# Patient Record
Sex: FEMALE | Race: WHITE
[De-identification: names, ages, dates, MRNs, and addresses within clinical notes are randomized per-mention and may not be internally consistent; named-entity substitution may affect disease eponyms.]

---

## 2017-01-17 ENCOUNTER — HOSPITAL ENCOUNTER (EMERGENCY)
Dept: HOSPITAL 68 - ERH | Age: 67
End: 2017-01-17
Payer: COMMERCIAL

## 2017-01-17 VITALS — WEIGHT: 260 LBS | HEIGHT: 63 IN | BODY MASS INDEX: 46.07 KG/M2

## 2017-01-17 VITALS — SYSTOLIC BLOOD PRESSURE: 143 MMHG | DIASTOLIC BLOOD PRESSURE: 83 MMHG

## 2017-01-17 DIAGNOSIS — H60.92: ICD-10-CM

## 2017-01-17 DIAGNOSIS — J32.9: Primary | ICD-10-CM

## 2017-01-17 NOTE — ED THROAT/DENTAL COMPLAINT
History of Present Illness
 
General
Chief Complaint: Sore Throat, Dental Pain
Stated Complaint: EAR PAIN, SORE THROAT, CONGESTION
Source: patient
Exam Limitations: no limitations
 
Vital Signs & Intake/Output
Vital Signs & Intake/Output
 Vital Signs
 
 
Date Time Temp Pulse Resp B/P Pulse O2 O2 Flow FiO2
 
     Ox Delivery Rate 
 
01/17 0858 98.7 81 20 143/83 97 Room Air  
 
 
Allergies
Coded Allergies:
aspirin (NAUSEA 07/31/16)
shellfish derived (RED FACED 07/31/16)
 
Reconcile Medications
Acetaminophen (Tylenol Extra Strength) 500 MG TABLET   1 TAB PO TID PAIN  (
Reported)
Albuterol Sulfate (Proair Hfa) 90 MCG HFA.AER.AD   2 PUF INH Q4-6 PRN PRN 
BREATHING PROBLEMS  (Reported)
Azithromycin (Zithromax) 250 MG TABLET   1 DP PO AD BRONCHITIS
     2 the first day followed by 1 for days 2-5
Cholecalciferol (Vitamin D3) 1,000 UNIT TABLET   1 TAB PO DAILY SUPPLEMENT  (
Reported)
Ciprofloxacin HCl/Dexameth (Ciprodex Otic Suspension) 0.3 %-0.1 % DROPS.SUSP   4
GTT OT BID OTITIS EXTERNA
Cyclobenzaprine HCl 10 MG TABLET   1 TAB PO BID MUSCLE SPASMS  (Reported)
Diltiazem HCl (Cardizem Cd) 180 MG CAP.ER.24H   1 CAP PO DAILY HEART  (Reported)
Esomeprazole (Nexium) 40 MG CAPSULE.DR   1 CAP PO DAILY AC GI  (Reported)
Furosemide 40 MG TABLET   1 TAB PO DAILY DIURETIC  (Reported)
Gabapentin 300 MG CAPSULE   1 CAP PO TID NERVE PAIN  (Reported)
Hyoscyamine (Levsin) 0.125 MG TABLET   1 TAB PO TID PRN ABD CRAMPS  (Reported)
Insulin Detemir (Levemir Flextouch) 100 UNIT/1 ML INSULN.PEN   30 UNITS SC QAM 
DIABETES  (Reported)
Latanoprost 2.5 ML DROPS   1 GTT OPH QPM BOTH EYES  (Reported)
Lisinopril 10 MG TABLET   1 TAB PO DAILY BP  (Reported)
Meloxicam 7.5 MG TABLET   1 TAB PO DAILY PAIN  (Reported)
Metoprolol Tartrate 100 MG TABLET   1 TAB PO BID HEART/BP  (Reported)
Mometasone Furoate (Nasonex) 17 GM SPRAY.PUMP   2 SPRAY NASB DAILY ALLERGIES  (
Reported)
Multivit-Min/Folic Acid/Biotin (Women Multivit W-Biotin Gummy) 200 MCG-300 MCG 
TAB.CHEW   1 TAB PO DAILY SUPPLEMENT  (Reported)
Omega-3 Fatty Acids/Fish Oil (Fish Oil 1,200 MG Softgel) 360 MG-1,200 MG CAPSULE
  1 CAP PO BID SUPPLEMENT  (Reported)
Potassium Chloride 10 MEQ TABLET.ER   1 TAB PO DAILY SUPPLEMENT  (Reported)
Rosuvastatin Calcium (Crestor) 10 MG TABLET   1 TAB PO DAILY CHOLESTEROL  (
Reported)
Tramadol HCl (Ultram) 50 MG TABLET   1 TAB PO Q6P PRN PAIN  (Reported)
Tramadol HCl 50 MG TABLET   1 TAB PO BIDP PRN PAIN
Vitamin E Mixed (Vitamin E) 400 UNIT TABLET   1 TAB PO DAILY SUPPLEMENT  (
Reported)
Warfarin Sodium 2 MG TABLET   1 TAB PO AD BLOOD THINNER  (Reported)
Warfarin Sodium (Coumadin) 1 MG TABLET   1 TAB PO AD BLOOD THINNER  (Reported)
 
Triage Note:
65 Y/O FEMALE C/O URI SYMPTOMS X A FEW WEEKS;
 FINISHED ANTIBIOTICS 2-3 WEEKS AGO FOR SINUS
 INFECTION BUT STATES HER SYMPTOMS CONTINUE.  C/O
 COUGH, HEADACHES, CONGESTION AND L EAR PAIN.
 AFEBRILE.
Triage Nurses Notes Reviewed? yes
Onset: Gradual
Duration: week(s): (FEW)
Timing: recent history
Injury Environment: home
Severity: moderate
Associated Symptoms: LEFT EAR PAIN, COUGH, SPUTUM, FACIAL PAIN, HEADACHE
HPI:
This is a 66 year old female who presents with a prolonged history of symptoms. 
Prior to rachelle she started spitting up green sputum and was put on a 10 day 
course of augmentin.  She reports feeling better for a short time but then 
reports return of symptoms 1 week later.  Sore throat, ear ache, cough.  Patient
reports left sided headache, subjective temperature last night.  Positive 
nausea.  Also reports for the past 3 days sputum which is blood tinged.  
 
Past History
 
Travel History
Traveled to [place] past 21 day No
 
Medical History
Any Pertinent Medical History? see below for history
Neurological: peripheral neuropathy
EENT: glaucoma, ABN AUDITORY PERCEPTION
Cardiovascular: AFIB, CHF, hypertension, hyperlipidemia
Respiratory: bronchitis, COPD
Gastrointestinal: GERD, irritable bowel syndrome
Hepatic: NONE
Renal: NONE
Musculoskeletal: chronic back pain, SHOULDER JOINT PAIN BILATERAL KNEE PAIN
Psychiatric: NONE
Endocrine: diabetes
History of MRSA: No
History of VRE: No
History of CDIFF: No
Pneumonia Vaccine: 12/02/10
Influenza Vaccine: 11/29/10
Tetanus Vaccine: 02/25/13
 
Surgical History
Surgical History: non-contributory
 
Psychosocial History
Who do you live with Patient/Self
Services at Home None
What is your primary language English
Tobacco Use: Never used
 
Family History
Family History, If Any:
FATHER (tb, CHF).
MOTHER (pancreatic ca).
 
Hx Contributory? No
 
Review of Systems
 
Review of Systems
Constitutional:
Reports: fever.  Denies: chills. 
EENTM:
Reports: ear pain. 
Respiratory:
Reports: cough, sputum production. 
Cardiovascular:
Denies: chest pain. 
GI:
Denies: abdominal pain. 
Genitourinary:
Reports: no symptoms. 
Musculoskeletal:
Reports: no symptoms. 
Skin:
Reports: no symptoms. 
Neurological/Psychological:
Reports: no symptoms. 
Hematologic/Endocrine:
Reports: bleeding (IN SPUTUM).  Denies: bruising. 
Immunologic/Allergic:
Denies: splenectomy, HIV/AIDS. 
All Other Systems: Reviewed and Negative
 
Physical Exam
 
Physical Exam
General Appearance: well developed/nourished, alert, awake, anxious, mild 
distress, TEARFUL ON EXAMINATION
Head: atraumatic, normal appearance
Eyes:
Bilateral: normal appearance, PERRL, EOMI. 
Ears:
Left: discharge, swelling, tenderness, Tympanic dull. 
Nose: DRIED BLOOD IN RIGHT NARE
Mouth/Throat: PHARYNGEAL ERYTHEMA, YELLOW MUCUS IN POSTERIOR PHARYNX
Neck: normal inspection, supple, full range of motion
Cardiovascular/Respiratory: normal breath sounds, no respiratory distress
Neurologic/Psych: no motor/sensory deficits, awake, alert, oriented x 3
Skin: intact, normal color, warm/dry
 
Core Measures
ACS in differential dx? No
Severe Sepsis Present: No
Septic Shock Present: No
 
Progress
Differential Diagnosis: SINUSITIS, PHARYNGITIS, OTITIS MEDIA, OTITIS EXTERNA, 
uri
Plan of Care:
 Orders
 
 
Procedure Date/time Status
 
THROAT CULTURE W/QUICK STREP 01/17 0937 Active
 
 
Patient will be treated for otitis externa, URI.  Lungs are clear to 
auscultation.  She does have some thick yellow sputum with scant blood when 
coughing and blowing her nose.  Patient is requesting azithromycin as she states
the Augmentin did not make her feel well.  She states the Z-Munir as worked for 
her in previous instances.
(SANTA CAMARILLO,HARESH)
 
Departure
 
Departure
Time of Disposition: 0944
Disposition: HOME OR SELF CARE
Condition: Stable
Clinical Impression
Primary Impression: Otitis externa
Secondary Impressions: Sinusitis
Referrals:
LEA GUPTA MD (PCP/Family)
 
Additional Instructions:
Take Z-Munir as directed.  Use the Ciprodex drops as prescribed.  Your 
prescriptions are at [facility].  Follow-up with your primary care doctor 
in the office, return as needed.
Departure Forms:
Customer Survey
General Discharge Information
Prescriptions:
Current Visit Scripts
Azithromycin (Zithromax) 1 DP PO AD 
     #6 TAB 
     2 the first day followed by 1 for days 2-5
 
Ciprofloxacin HCl/Dexameth (Ciprodex Otic Suspension) 4 GTT OT BID 
     #1 BOT 
 
Tramadol HCl 1 TAB PO BIDP PRN PAIN
     #10 TAB

## 2017-01-18 ENCOUNTER — HOSPITAL ENCOUNTER (EMERGENCY)
Dept: HOSPITAL 68 - ERH | Age: 67
End: 2017-01-18
Payer: COMMERCIAL

## 2017-01-18 VITALS — WEIGHT: 165 LBS | HEIGHT: 63 IN | BODY MASS INDEX: 29.23 KG/M2

## 2017-01-18 VITALS — DIASTOLIC BLOOD PRESSURE: 71 MMHG | SYSTOLIC BLOOD PRESSURE: 118 MMHG

## 2017-01-18 DIAGNOSIS — R51: ICD-10-CM

## 2017-01-18 DIAGNOSIS — H60.92: Primary | ICD-10-CM

## 2017-01-18 DIAGNOSIS — K13.79: ICD-10-CM

## 2017-01-18 LAB
ABSOLUTE GRANULOCYTE CT: 8 /CUMM (ref 1.4–6.5)
BASOPHILS # BLD: 0.1 /CUMM (ref 0–0.2)
BASOPHILS NFR BLD: 0.5 % (ref 0–2)
EOSINOPHIL # BLD: 0 /CUMM (ref 0–0.7)
EOSINOPHIL NFR BLD: 0.2 % (ref 0–5)
ERYTHROCYTE [DISTWIDTH] IN BLOOD BY AUTOMATED COUNT: 15.8 % (ref 11.5–14.5)
GRANULOCYTES NFR BLD: 78.1 % (ref 42.2–75.2)
HCT VFR BLD CALC: 38.7 % (ref 37–47)
LYMPHOCYTES # BLD: 1.2 /CUMM (ref 1.2–3.4)
MCH RBC QN AUTO: 35.5 PG (ref 27–31)
MCHC RBC AUTO-ENTMCNC: 33.8 G/DL (ref 33–37)
MCV RBC AUTO: 105.1 FL (ref 81–99)
MONOCYTES # BLD: 1 /CUMM (ref 0.1–0.6)
PLATELET # BLD: 141 /CUMM (ref 130–400)
PMV BLD AUTO: 8.7 FL (ref 7.4–10.4)
PROTHROMBIN TIME: 30.4 SEC (ref 9.4–12.5)
RED BLOOD CELL CT: 3.69 /CUMM (ref 4.2–5.4)
WBC # BLD AUTO: 10.3 /CUMM (ref 4.8–10.8)

## 2017-01-18 NOTE — CT SCAN REPORT
EXAMINATION:
CT INTERNAL AUDITORY CANALS WITHOUT CONTRAST
 
CLINICAL INFORMATION:
Treatment. Severe left-sided headache. Left ear pain. Serosanguineous fluid.
 
COMPARISON:
CT scan of the orbits 07/31/2016.
 
TECHNIQUE:
Contiguous axial imaging was performed without intravenous administration of
contrast.
 
DLP:
156.5 mGy-cm.
 
FINDINGS:
Left temporal bone:
There is a mixed air and fluid filling the left external auditory canal.
There is also near total opacification of the left middle ear cavity and
mastoid air cells. The ossicular chain is intact and there is no evidence of
coalescence or worrisome osseous erosion. The otic capsule is intact.
Labyrinthine structures are normal. The internal auditory canal is
unremarkable.
 
Right temporal bone:
The external auditory canal is normal and there is no abnormal thickening of
the tympanic membrane. The ossicular chain is intact. The middle ear cavity
and mastoid air cells are well aerated. The otic capsule is intact.
Labyrinthine structures are normal. The internal auditory canal is
unremarkable.
 
Other:
Mild-to-moderate paranasal sinus disease is partially visualized within the
maxillary sinuses and ethmoid air cells. The temporomandibular joints are
symmetric.
 
IMPRESSION:
There is near total opacification of the left middle ear cavity and mastoid
air cells with fluid or debris within the left external auditory canal.
Findings are most consistent with acute otomastoiditis and otitis externa
with no evidence of coalescence, ossicular destruction, or worrisome erosive
changes.

## 2017-01-18 NOTE — RADIOLOGY REPORT
EXAMINATION:
XR PORTABLE CHEST
 
CLINICAL INFORMATION:
Coughing up blood
 
COMPARISON:
January 6, 2016
 
TECHNIQUE:
Portable view of the chest was obtained.
 
FINDINGS:
No significant abnormality is noted involving the heart, lungs, mediastinum,
bony thorax or soft tissues.
 
IMPRESSION:
No acute disease.

## 2017-01-18 NOTE — CT SCAN REPORT
EXAMINATION:
CT HEAD WITHOUT CONTRAST
 
CLINICAL INFORMATION:
Coumadin severe left-sided headache
 
COMPARISON:
None.
 
TECHNIQUE:
Contiguous axial imaging was performed from the skull base to vertex without
intravenous administration of contrast.
 
 
No acute finding. No hemorrhage, midline shift or mass effect. The basal
cisterns are patent. The posterior fossa risk grossly within normal limits.
No extra-axial collection.
 
IMPRESSION: Negative acute noncontrast CT of the brain.

## 2017-01-18 NOTE — ED GENERAL ADULT
History of Present Illness
 
General
Chief Complaint: General Adult
Stated Complaint: BIBA BLOOD COMING FROM MOUTH,EAR,NOSE
Source: patient
Exam Limitations: no limitations
 
Vital Signs & Intake/Output
Vital Signs & Intake/Output
 Vital Signs
 
 
Date Time Temp Pulse Resp B/P Pulse O2 O2 Flow FiO2
 
     Ox Delivery Rate 
 
 1410  72 22 118/71 98   
 
 1333 98.1 77 18 125/70 97 Room Air  
 
 1100 96.2 78 18 122/83 96 Room Air  
 
 
Allergies
Coded Allergies:
aspirin (NAUSEA 16)
shellfish derived (RED FACED 16)
 
Reconcile Medications
Acetaminophen (Tylenol Extra Strength) 500 MG TABLET   1 TAB PO TID PAIN  (
Reported)
Albuterol Sulfate (Proair Hfa) 90 MCG HFA.AER.AD   2 PUF INH Q4-6 PRN PRN 
BREATHING PROBLEMS  (Reported)
Amoxicillin/Potassium Clav (Augmentin 875-125 Tablet) 875 MG-125 MG TABLET   1 
TAB PO BID OTITIS EXTERNA
Azithromycin (Zithromax) 250 MG TABLET   1 DP PO AD BRONCHITIS
     2 the first day followed by 1 for days 2-5
Cholecalciferol (Vitamin D3) 1,000 UNIT TABLET   1 TAB PO DAILY SUPPLEMENT  (
Reported)
Ciprofloxacin HCl/Dexameth (Ciprodex Otic Suspension) 0.3 %-0.1 % DROPS.SUSP   4
GTT OT BID OTITIS EXTERNA
Cyclobenzaprine HCl 10 MG TABLET   1 TAB PO BID MUSCLE SPASMS  (Reported)
Diltiazem HCl (Cardizem Cd) 180 MG CAP.ER.24H   1 CAP PO DAILY HEART  (Reported)
Esomeprazole (Nexium) 40 MG CAPSULE.DR   1 CAP PO DAILY AC GI  (Reported)
Furosemide 40 MG TABLET   1 TAB PO DAILY DIURETIC  (Reported)
Gabapentin 300 MG CAPSULE   1 CAP PO TID NERVE PAIN  (Reported)
Hyoscyamine (Levsin) 0.125 MG TABLET   1 TAB PO TID PRN ABD CRAMPS  (Reported)
Insulin Detemir (Levemir Flextouch) 100 UNIT/1 ML INSULN.PEN   30 UNITS SC QAM 
DIABETES  (Reported)
Latanoprost 2.5 ML DROPS   1 GTT OPH QPM BOTH EYES  (Reported)
Lisinopril 10 MG TABLET   1 TAB PO DAILY BP  (Reported)
Meloxicam 7.5 MG TABLET   1 TAB PO DAILY PAIN  (Reported)
Metoprolol Tartrate 100 MG TABLET   1 TAB PO BID HEART/BP  (Reported)
Mometasone Furoate (Nasonex) 17 GM SPRAY.PUMP   2 SPRAY NASB DAILY ALLERGIES  (
Reported)
Multivit-Min/Folic Acid/Biotin (Women Multivit W-Biotin Gummy) 200 MCG-300 MCG 
TAB.CHEW   1 TAB PO DAILY SUPPLEMENT  (Reported)
Omega-3 Fatty Acids/Fish Oil (Fish Oil 1,200 MG Softgel) 360 MG-1,200 MG CAPSULE
  1 CAP PO BID SUPPLEMENT  (Reported)
Oxycodone HCl/Acetaminophen (Percocet 5-325 MG Tablet) 5 MG-325 MG TABLET   1 
TAB PO Q6HR PRN PAIN
Potassium Chloride 10 MEQ TABLET.ER   1 TAB PO DAILY SUPPLEMENT  (Reported)
Rosuvastatin Calcium (Crestor) 10 MG TABLET   1 TAB PO DAILY CHOLESTEROL  (
Reported)
Tramadol HCl (Ultram) 50 MG TABLET   1 TAB PO Q6P PRN PAIN  (Reported)
Tramadol HCl 50 MG TABLET   1 TAB PO BIDP PRN PAIN
Vitamin E Mixed (Vitamin E) 400 UNIT TABLET   1 TAB PO DAILY SUPPLEMENT  (
Reported)
Warfarin Sodium 2 MG TABLET   1 TAB PO AD BLOOD THINNER  (Reported)
Warfarin Sodium (Coumadin) 1 MG TABLET   1 TAB PO AD BLOOD THINNER  (Reported)
 
Triage Note:
C/O BLEEDING FROM LEFT EAR AND AND THROAT. AND
 WHILE BRUSHING TEETH THIS AM.  SEEN DEONTE
 YESTERDAY FOR SINUS INFECTION.  PUT ON ABX, AND
 EAR DROPS.  ALSO C/O LEFT EAR PAIN.
Triage Nurses Notes Reviewed? yes
Onset: Gradual
Duration: day(s): (1)
Timing: no prior history
Injury Environment: home
Severity: moderate
Severity Numbers: 8
No Modifying Factors: none
HPI:
Patient is a 66-year-old female presenting to the emergency department with 
chief complaint of upper respiratory congestion, left-sided headache thing going
on for the past several days.  She was seen and evaluated here yesterday 
diagnosed with an upper respiratory infection and started on a Z-Munir.  Patient 
takes Coumadin for A. fib.  She reports that overnight the headache became worse
and the sinus pressure became worse.  She woke up this morning and noticed that 
she was bleeding from her left ear and her right nostril.  Denies any trauma.  
She reports that the left ear hearing feels muffled.  Denies taking anything in 
her ear.  Denies any ringing in the ear.  Denies any confusion nausea vomiting 
fevers or chills.  No chest pain or shortness of breath.  No abdominal pain.  No
urinary symptoms here denies any visual changes.  No confusion.  She called her 
primary care physician this morning who told her to come to the emergency 
department immediately for evaluation.
(MARYSOL MONGE)
 
Past History
 
Travel History
Traveled to [place] past 21 day No
 
Medical History
Any Pertinent Medical History? see below for history
Neurological: peripheral neuropathy
EENT: glaucoma, ABN AUDITORY PERCEPTION
Cardiovascular: AFIB, CHF, hypertension, hyperlipidemia
Respiratory: bronchitis, COPD
Gastrointestinal: GERD, irritable bowel syndrome
Hepatic: NONE
Renal: NONE
Musculoskeletal: chronic back pain, SHOULDER JOINT PAIN BILATERAL KNEE PAIN
Psychiatric: NONE
Endocrine: diabetes
History of MRSA: No
History of VRE: No
History of CDIFF: No
Tetanus Vaccine: 13
 
Surgical History
Surgical History: non-contributory
 
Psychosocial History
Who do you live with Patient/Self
Services at Home None
What is your primary language English
Tobacco Use: Never used
ETOH Use: occasional use
 
Family History
Family History, If Any:
FATHER (tb, CHF).
MOTHER (pancreatic ca).
 
Hx Contributory? No
(MARYSOL MONGE)
 
Review of Systems
 
Review of Systems
Constitutional:
Reports: no symptoms. 
Comments
Review of systems: See HPI, All other systems negative.
Constitutional, no chills fever or weight loss
HEENT: No visual changes no sore throat 
Cardiovascular: No chest pain ,palpitation , orthopnea or ankle swelling
Skin, no jaundice no rashes
Respiratory: No dyspnea cough sputum or hemoptysis
GI: No nausea no vomiting
: No dysuria No hematuria
Muscle skeletal: no back pain, no neck pain,
Neurologic: No numbness no confusion
Psych: POSITIVE STRESS
Heme/endocrine: No bruising no bleeding no polyuria or polydipsia
Immunology: No splenectomy or history of AIDS
(MARYSOL MONGE)
 
Physical Exam
 
Physical Exam
General Appearance: well developed/nourished, no apparent distress, alert, awake
, comfortable
Comments:
Well-developed well-nourished person in no acute distress
HEENT: extraocular motion intact, no nystagmus. Pupils equally round and 
reactive to light and accommodation.  Right naris has dried blood, no septal 
hematoma noted.  External auditory canal and Tympanic membranE clear on the 
right side.  Unable to visualize left TM secondary to serosanguineous fluid in 
the external canal.. The external canal on the left is also very erythematous 
and swollen.  Pharynx normal. No swelling or edema.  Tenderness to palpation 
over the preauricular and postauricular area.  No redness noted in these areas.
Neck: Supple, no lymphadenopathy, normal range of motion without pain or 
tenderness
Back: Nontender
Cardiovascular: Regular rate and rhythms no murmurs rubs or gallops, normal JVP
Respiratory: Chest nontender. No respiratory distress.breath sounds clear to 
auscultation bilaterally
Abdomen: Soft, obese,  nontender nondistended, no appreciable organomegaly. 
Normal bowel sounds. No ascites
Extremity: No edema, full range of motion of upper and lower extremities.
Neuro: Alert oriented x3, motor sensory normal, cranial nerves II through XII 
grossly intact.  Cerebellar testing is unremarkable.
Skin: No appreciable rash on exposed skin, skin is warm and dry.
Psych: Appears anxious, tearful.
 
Core Measures
ACS in differential dx? No
CVA/TIA Diagnosis: No
Severe Sepsis Present: No
Septic Shock Present: No
(LILIAN FRIAS,MARYSOL)
 
Progress
Differential Diagnoses
I considered the following diagnoses in my evaluation of the patient: CSF leak, 
intracranial hemorrhage, mastoiditis, otitis externa, malignant otitis, upper 
respiratory infection, hypo-coagulopathy
 
Plan of Care:
 Orders
 
 
Procedure Date/time Status
 
PROTHROMBIN TIME  1118 Complete
 
COMPREHENSIVE METABOLIC PANEL  1118 Complete
 
CBC WITHOUT DIFFERENTIAL  111 Complete
 
 
 Laboratory Tests
 
 
 
17 1129:
Anion Gap 10, Estimated GFR 56  L, BUN/Creatinine Ratio 18.0, Glucose 162  H, 
Calcium 9.1, Total Bilirubin 1.0, AST 20, ALT 34, Alkaline Phosphatase 85, Total
Protein 6.5, Albumin 3.7, Globulin 2.8, Albumin/Globulin Ratio 1.3, PT 30.4  H, 
INR 2.93  H, CBC w Diff NO MAN DIFF REQ, RBC 3.69  L, .1  H, MCH 35.5  H,
RDW 15.8  H, MPV 8.7, Gran % 78.1  H, Lymphocytes % 11.9  L, Monocytes % 9.3, 
Eosinophils % 0.2, Basophils % 0.5, Absolute Granulocytes 8.0  H, Absolute 
Lymphocytes 1.2, Absolute Monocytes 1.0  H, Absolute Eosinophils 0, Absolute 
Basophils 0.1, PUBS MCHC 33.8
Diagnostic Imaging:
Viewed by Me: Radiology Read, CT Scan.  Discussed w/RAD: Radiology Read, CT 
Scan. 
Radiology Impression: ORDERING PHYSICIAN: MARYSOL FRIAS     SERVICE DATE: 
 EXAM TYPE: CAT - CT HEAD WO IV CONTRAST EXAMINATION: CT HEAD 
WITHOUT CONTRAST CLINICAL INFORMATION: Coumadin severe left-sided headache 
COMPARISON: None. TECHNIQUE: Contiguous axial imaging was performed from the 
skull base to vertex without intravenous administration of contrast. No acute 
finding. No hemorrhage, midline shift or mass effect. The basal cisterns are 
patent. The posterior fossa risk grossly within normal limits. No extra-axial 
collection. IMPRESSION: Negative acute noncontrast CT of the brain., :   LOCATION: [facility] ORDERING PHYSICIAN: MARYSOL FRIAS     SERVICE DATE:  EXAM TYPE: CAT - CT INT AUD CANALS WO IV CONT EXAMINATION: CT 
INTERNAL AUDITORY CANALS WITHOUT CONTRAST CLINICAL INFORMATION: Treatment. 
Severe left-sided headache. Left ear pain. Serosanguineous fluid. COMPARISON: CT
scan of the orbits 2016. TECHNIQUE: Contiguous axial imaging was performed
without intravenous administration of contrast. DLP: 156.5 mGy-cm. FINDINGS: 
Left temporal bone: There is a mixed air and fluid filling the left external 
auditory canal. There is also near total opacification of the left middle ear 
cavity and mastoid air cells. The ossicular chain is intact and there is no 
evidence of coalescence or worrisome osseous erosion. The otic capsule is 
intact. Labyrinthine structures are normal. The internal auditory canal is 
unremarkable. Right temporal bone: The external auditory canal is normal and 
there is no abnormal thickening of the tympanic membrane. The ossicular chain is
intact. The middle ear cavity and mastoid air cells are well aerated. The otic 
capsule is intact. Labyrinthine structures are normal. The internal auditory 
canal is unremarkable. Other: Mild-to-moderate paranasal sinus disease is 
partially visualized within the maxillary sinuses and ethmoid air cells. The 
temporomandibular joints are symmetric. IMPRESSION: There is near total 
opacification of the left middle ear cavity and mastoid air cells with fluid or 
debris within the left external auditory canal. Findings are most consistent 
with acute otomastoiditis and otitis externa with no evidence of coalescence, 
ossicular destruction, or worrisome erosive changes.
Initial ED EKG: none
Comments:
Patient was given oxycodone for pain on arrival.  Concerned about extensive 
otitis externa or mastoiditis.  Patient will have CT of the head and AUral 
canals. 
 
Patient was informed of all imaging results and lab results.  Spoke with 
, covering ear nose and throat, he feels that this patient has 
extensive otitis externa, not so much concern at mastoiditis as patient does 
have.  Given her tenderness on exam and external canal is very erythematous and 
swollen.  Patient will be switched to Augmentin.  She'll continue Ciprodex that 
she has artificial that medication.  Patient also prescribed pain medication.  
She has been scheduled for an appointment for 10:15 tomorrow morning and [place]
with Dr. Jarvis.  Patient reports that she will be able to make the appointment.  
She will return to the emergency department for any worsening symptoms or 
concerns.
(MARYSOL MONGE)
 
Departure
 
Departure
Time of Disposition: 1420
Disposition: HOME OR SELF CARE
Condition: Stable
Clinical Impression
Primary Impression: Otitis externa
Qualifiers:  Otitis externa type: unspecified type Laterality: left Chronicity: 
acute Qualified Code: H60.502 - Unspecified acute noninfective otitis externa, 
left ear
Referrals:
ADDIE CAMARILLO,WIL JARVIS MD,PRASANNA GUPTA MD,LEA (PCP/Family)
 
Additional Instructions:
Follow-up with ear nose and throat doctor tomorrow morning as scheduled.  Take 
Augmentin as prescribed.  Stop taking Z-Munir.  Continue using eardrops, Ciprodex 
as prescribed.  Take Percocet as prescribed for pain.  Return for worsening 
symptoms or concerns.
Departure Forms:
Customer Survey
General Discharge Information
Prescriptions:
Current Visit Scripts
Amoxicillin/Potassium Clav (Augmentin 875-125 Tablet) 1 TAB PO BID 
     #20 TAB 
 
Oxycodone HCl/Acetaminophen (Percocet 5-325 MG Tablet) 1 TAB PO Q6HR PRN PAIN
     #10 TAB 
 
 
(MARYSOL MONGE)
 
PA/NP Co-Sign Statement
Statement:
ED Attending supervision documentation-
 
x I saw and evaluated the patient. I have also reviewed all the pertinent lab 
results and diagnostic results. I agree with the findings and the plan of care 
as documented in the PA's/NP's documentation. 
 
[] I have reviewed the ED Record and agree with the PA's/NP's documentation.
 
[] Additions or exceptions (if any) to the PAs/NP's note and plan are 
summarized below:
[]
 
(LARA CAMARILLO,OLIVER)
 
Critical Care Note
 
Critical Care Note
Critical Care Time: non-applicable
(LILIAN FRIAS,MARYSOL)

## 2018-02-28 ENCOUNTER — HOSPITAL ENCOUNTER (EMERGENCY)
Dept: HOSPITAL 68 - ERH | Age: 68
LOS: 1 days | End: 2018-03-01
Payer: COMMERCIAL

## 2018-02-28 VITALS — WEIGHT: 293 LBS | BODY MASS INDEX: 50.02 KG/M2 | HEIGHT: 64 IN

## 2018-02-28 DIAGNOSIS — E87.6: ICD-10-CM

## 2018-02-28 DIAGNOSIS — I10: ICD-10-CM

## 2018-02-28 DIAGNOSIS — E83.42: Primary | ICD-10-CM

## 2018-02-28 DIAGNOSIS — I48.91: ICD-10-CM

## 2018-02-28 DIAGNOSIS — I50.9: ICD-10-CM

## 2018-02-28 LAB
ABSOLUTE GRANULOCYTE CT: 3.8 /CUMM (ref 1.4–6.5)
BASOPHILS # BLD: 0 /CUMM (ref 0–0.2)
BASOPHILS NFR BLD: 0.7 % (ref 0–2)
EOSINOPHIL # BLD: 0.1 /CUMM (ref 0–0.7)
EOSINOPHIL NFR BLD: 1.8 % (ref 0–5)
ERYTHROCYTE [DISTWIDTH] IN BLOOD BY AUTOMATED COUNT: 14.4 % (ref 11.5–14.5)
GRANULOCYTES NFR BLD: 61.8 % (ref 42.2–75.2)
HCT VFR BLD CALC: 37.7 % (ref 37–47)
LYMPHOCYTES # BLD: 1.6 /CUMM (ref 1.2–3.4)
MCH RBC QN AUTO: 34.9 PG (ref 27–31)
MCHC RBC AUTO-ENTMCNC: 33.6 G/DL (ref 33–37)
MCV RBC AUTO: 104 FL (ref 81–99)
MONOCYTES # BLD: 0.6 /CUMM (ref 0.1–0.6)
PLATELET # BLD: 137 /CUMM (ref 130–400)
PMV BLD AUTO: 8.9 FL (ref 7.4–10.4)
RED BLOOD CELL CT: 3.63 /CUMM (ref 4.2–5.4)
WBC # BLD AUTO: 6.2 /CUMM (ref 4.8–10.8)

## 2018-02-28 NOTE — ED GENERAL ADULT
History of Present Illness
 
General
Chief Complaint: General Adult
Stated Complaint: SIB PCP TO GET AN IV FRO MAGNISIUM BEING 0.9
Source: patient, old records
Exam Limitations: no limitations
 
Vital Signs & Intake/Output
Vital Signs & Intake/Output
 Vital Signs
 
 
Date Time Temp Pulse Resp B/P B/P Pulse O2 O2 Flow FiO2
 
     Mean Ox Delivery Rate 
 
02/28 2335  88 16 121/78  97 Room Air  
 
02/28 1930 97.4 95 18 115/79  97 Room Air  
 
 
 ED Intake and Output
 
 
 03/01 0000 02/28 1200
 
Intake Total 0 
 
Output Total  
 
Balance 0 
 
   
 
Intake, Oral 0 
 
Patient 300 lb 
 
Weight  
 
 
 
Allergies
Coded Allergies:
aspirin (NAUSEA 07/31/16)
shellfish derived (RED FACED 07/31/16)
 
Reconcile Medications
Acetaminophen (Tylenol Extra Strength) 500 MG TABLET   1 TAB PO TID PAIN  (
Reported)
Albuterol Sulfate (Proair Hfa) 90 MCG HFA.AER.AD   2 PUF INH Q4-6 PRN PRN 
BREATHING PROBLEMS  (Reported)
Amoxicillin/Potassium Clav (Augmentin 875-125 Tablet) 875 MG-125 MG TABLET   1 
TAB PO BID OTITIS EXTERNA
Azithromycin (Zithromax) 250 MG TABLET   1 DP PO AD BRONCHITIS
     2 the first day followed by 1 for days 2-5
Cephalexin (Keflex) 500 MG CAPSULE   1 CAP PO TID CELLULITIS 
Cholecalciferol (Vitamin D3) 1,000 UNIT TABLET   1 TAB PO DAILY SUPPLEMENT  (
Reported)
Ciprofloxacin HCl/Dexameth (Ciprodex Otic Suspension) 0.3 %-0.1 % DROPS.SUSP   4
GTT OT BID OTITIS EXTERNA
Cyclobenzaprine HCl 10 MG TABLET   1 TAB PO BID MUSCLE SPASMS  (Reported)
Diltiazem HCl (Cardizem Cd) 180 MG CAP.ER.24H   1 CAP PO DAILY HEART  (Reported)
Esomeprazole (Nexium) 40 MG CAPSULE.DR   1 CAP PO DAILY AC GI  (Reported)
Furosemide 40 MG TABLET   1 TAB PO DAILY DIURETIC  (Reported)
Gabapentin 300 MG CAPSULE   1 CAP PO TID NERVE PAIN  (Reported)
Hyoscyamine (Levsin) 0.125 MG TABLET   1 TAB PO TID PRN ABD CRAMPS  (Reported)
Insulin Detemir (Levemir Flextouch) 100 UNIT/1 ML INSULN.PEN   30 UNITS SC QAM 
DIABETES  (Reported)
Latanoprost 2.5 ML DROPS   1 GTT OPH QPM BOTH EYES  (Reported)
Lisinopril 10 MG TABLET   1 TAB PO DAILY BP  (Reported)
Magnesium Oxide (Magnesium) 400 MG CAPSULE   1 CAP PO DAILY hypomagnesemia
Meloxicam 7.5 MG TABLET   1 TAB PO DAILY PAIN  (Reported)
Metoprolol Tartrate 100 MG TABLET   1 TAB PO BID HEART/BP  (Reported)
Mometasone Furoate (Nasonex) 17 GM SPRAY.PUMP   2 SPRAY NASB DAILY ALLERGIES  (
Reported)
Multivit-Min/Folic Acid/Biotin (Women Multivit W-Biotin Gummy) 200 MCG-300 MCG 
TAB.CHEW   1 TAB PO DAILY SUPPLEMENT  (Reported)
Omega-3 Fatty Acids/Fish Oil (Fish Oil 1,200 MG Softgel) 360 MG-1,200 MG CAPSULE
  1 CAP PO BID SUPPLEMENT  (Reported)
Oxycodone HCl/Acetaminophen (Percocet 5-325 MG Tablet) 5 MG-325 MG TABLET   1 
TAB PO TID PRN PAIN
Oxycodone HCl/Acetaminophen (Percocet 5-325 MG Tablet) 5 MG-325 MG TABLET   1 
TAB PO Q6HR PRN PAIN
Potassium Chloride 10 MEQ TABLET.ER   1 TAB PO DAILY SUPPLEMENT  (Reported)
Rosuvastatin Calcium (Crestor) 10 MG TABLET   1 TAB PO DAILY CHOLESTEROL  (
Reported)
Tramadol HCl (Ultram) 50 MG TABLET   1 TAB PO Q6P PRN PAIN  (Reported)
Tramadol HCl 50 MG TABLET   1 TAB PO BIDP PRN PAIN
Vitamin E Mixed (Vitamin E) 400 UNIT TABLET   1 TAB PO DAILY SUPPLEMENT  (
Reported)
Warfarin Sodium 2 MG TABLET   1 TAB PO AD BLOOD THINNER  (Reported)
Warfarin Sodium (Coumadin) 1 MG TABLET   1 TAB PO AD BLOOD THINNER  (Reported)
 
Core Measure Meds Pre-Hospital coumadin
Triage Note:
PT TO TRIAGE S/P RECIEVING STANDARD BLOOD WORK
 RESULTS TODAY WITH A MAGNESIUM OF 0.9. PT WAS TOLD
 TO COME TO ER BY  FOR IV INFUSION AND EKG
 D/T HX:AFIB AND CHF. PT REPORTS FEELING WELL AND
 HAS NO COMPLAINTS.
Triage Nurses Notes Reviewed? yes
Onset: Just prior to arrival
Duration: hour(s):, constant, continues in ED
Timing: recent history
Injury Environment: home
Severity: moderate
No Modifying Factors: none
LMP (ages 10-50): post menopausal
Pregnant: No
Patient currently breastfeeds: No
HPI:
Prior to admission patient referred for repletion of magnesium.
She denies fever chills nausea vomiting diarrhea abdominal pain chest pain 
shortness of breath headache dysuria rash bleeding.
 
Past History
 
Travel History
Traveled to [place] past 21 day No
 
Medical History
Any Pertinent Medical History? see below for history
Neurological: peripheral neuropathy
EENT: glaucoma, ABN AUDITORY PERCEPTION
Cardiovascular: AFIB, CHF, hypertension, hyperlipidemia
Respiratory: bronchitis, COPD
Gastrointestinal: GERD, irritable bowel syndrome
Hepatic: NONE
Renal: NONE
Musculoskeletal: chronic back pain, SHOULDER JOINT PAIN BILATERAL KNEE PAIN
Psychiatric: NONE
Endocrine: diabetes
Blood Disorders: NONE
Cancer(s): NONE
GYN/Reproductive: NONE
History of MRSA: No
History of VRE: No
History of CDIFF: No
Tetanus Vaccine: 02/25/13
 
Surgical History
Surgical History: non-contributory
 
Psychosocial History
Who do you live with Patient/Self
Services at Home None
What is your primary language English
Tobacco Use: Never used
ETOH Use: occasional use
 
Family History
Family History, If Any:
FATHER (tb, CHF).
MOTHER (pancreatic ca).
 
Hx Contributory? No
 
Review of Systems
 
Review of Systems
Constitutional:
Reports: no symptoms. 
EENTM:
Reports: no symptoms. 
Respiratory:
Reports: no symptoms. 
Cardiovascular:
Reports: no symptoms. 
GI:
Reports: no symptoms. 
Genitourinary:
Reports: no symptoms. 
Musculoskeletal:
Reports: no symptoms. 
Skin:
Reports: no symptoms. 
Neurological/Psychological:
Reports: no symptoms. 
Hematologic/Endocrine:
Reports: no symptoms. 
Immunologic/Allergic:
Reports: no symptoms. 
All Other Systems: Reviewed and Negative
 
Physical Exam
 
Physical Exam
General Appearance: well developed/nourished, alert, awake, anxious, obese
Head: atraumatic, normal appearance
Eyes:
Bilateral: normal appearance, PERRL, EOMI. 
Ears, Nose, Throat: normal pharynx, normal ENT inspection, hearing grossly 
normal
Neck: normal inspection, supple, full range of motion, no midline tenderness
Respiratory: normal breath sounds, chest non-tender, no respiratory distress, 
quiet respiration, lungs clear
Cardiovascular: regular rate/rhythm, normal peripheral pulses, norml femoral 
pulses equa
Peripheral Pulses:
4+ carotid (R), 4+ carotid (L)
Gastrointestinal: normal bowel sounds, soft, non-tender, no organomegaly
Back: normal inspection, normal range of motion, no vertebral tenderness
Extremities: normal inspection, normal capillary refill, normal range of motion,
no edema
Neurologic/Psych: no motor/sensory deficits, awake, alert, oriented x 3, normal 
gait, normal mood/affect, CNs II-XII nml as tested
Reflexes:
2+: bicep (R), bicep (L). 
Skin: intact, normal color, warm/dry
Lymphatic: no anterior cervical charlene
 
Core Measures
ACS in differential dx? No
CVA/TIA Diagnosis: No
Sepsis Present: No
Sepsis Focused Exam Completed? No
 
Progress
Differential Diagnoses
I considered the following diagnoses in my evaluation of the patient: 
Hypomagnesemia hypokalemia adverse medication reaction
 
Plan of Care:
 Orders
 
 
Procedure Date/time Status
 
TROPONIN LEVEL 02/28 1939 Complete
 
MAGNESIUM 02/28 1939 Complete
 
COMPREHENSIVE METABOLIC PANEL 02/28 1939 Complete
 
CBC WITHOUT DIFFERENTIAL 02/28 1939 Complete
 
EKG 02/28 1932 Active
 
 
 Current Medications
 
 
  Sig/Andrea Start time  Last
 
Medication Dose  Stop Time Status Admin
 
Magnesium Sulfate 2 GM ONCE ONE 02/28 2215 CAN 
 
   02/28 2216  
 
 
 Laboratory Tests
 
 
 
02/28/18 2016:
Anion Gap 13, Estimated GFR 56  L, BUN/Creatinine Ratio 22.0, Glucose 135  H, 
Calcium 7.5  L, Magnesium 0.8 *L, Total Bilirubin 0.3, AST 23, ALT 27, Alkaline 
Phosphatase 53, Troponin I < 0.01, Total Protein 5.2  L, Albumin 3.0  L, 
Globulin 2.2, Albumin/Globulin Ratio 1.4, CBC w Diff NO MAN DIFF REQ, RBC 3.63  
L, .0  H, MCH 34.9  H, MCHC 33.6, RDW 14.4, MPV 8.9, Gran % 61.8, 
Lymphocytes % 26.3, Monocytes % 9.4  H, Eosinophils % 1.8, Basophils % 0.7, 
Absolute Granulocytes 3.8, Absolute Lymphocytes 1.6, Absolute Monocytes 0.6, 
Absolute Eosinophils 0.1, Absolute Basophils 0
 
Initial ED EKG: normal axis, normal intervals, normal p-waves, normal QRS 
complex, normal sinus rhythm, no ST T wave changes
Prior EKG: unchanged
Rhythm Strip: normal sinus rhythm
 
Departure
 
Departure
Time of Disposition: 0305
Disposition: HOME OR SELF CARE
Condition: Stable
Clinical Impression
Primary Impression: Hypomagnesemia
Secondary Impressions: Hypokalemia
Referrals:
Corey CAMARILLO,Bryce GIL (PCP/Family)
 
Departure Forms:
Customer Survey
General Discharge Information
Prescriptions:
Current Visit Scripts
Magnesium Oxide (Magnesium) 1 CAP PO DAILY  
     #30 CAP 
 
 
 
Critical Care Note
 
Critical Care Note
Critical Care Time: non-applicable

## 2018-03-01 VITALS — DIASTOLIC BLOOD PRESSURE: 67 MMHG | SYSTOLIC BLOOD PRESSURE: 105 MMHG

## 2018-05-07 ENCOUNTER — HOSPITAL ENCOUNTER (EMERGENCY)
Dept: HOSPITAL 68 - ERH | Age: 68
End: 2018-05-07
Payer: COMMERCIAL

## 2018-05-07 VITALS — SYSTOLIC BLOOD PRESSURE: 110 MMHG | DIASTOLIC BLOOD PRESSURE: 64 MMHG

## 2018-05-07 VITALS — HEIGHT: 63 IN | BODY MASS INDEX: 46.95 KG/M2 | WEIGHT: 265 LBS

## 2018-05-07 DIAGNOSIS — L03.115: Primary | ICD-10-CM

## 2018-05-07 DIAGNOSIS — M10.9: ICD-10-CM

## 2018-05-07 DIAGNOSIS — Z79.01: ICD-10-CM

## 2018-05-07 LAB
ABSOLUTE GRANULOCYTE CT: 5.7 /CUMM (ref 1.4–6.5)
BASOPHILS # BLD: 0 /CUMM (ref 0–0.2)
BASOPHILS NFR BLD: 0.6 % (ref 0–2)
EOSINOPHIL # BLD: 0.1 /CUMM (ref 0–0.7)
EOSINOPHIL NFR BLD: 0.8 % (ref 0–5)
ERYTHROCYTE [DISTWIDTH] IN BLOOD BY AUTOMATED COUNT: 14.5 % (ref 11.5–14.5)
GRANULOCYTES NFR BLD: 72.1 % (ref 42.2–75.2)
HCT VFR BLD CALC: 43.3 % (ref 37–47)
LYMPHOCYTES # BLD: 1.6 /CUMM (ref 1.2–3.4)
MCH RBC QN AUTO: 34 PG (ref 27–31)
MCHC RBC AUTO-ENTMCNC: 33 G/DL (ref 33–37)
MCV RBC AUTO: 103.2 FL (ref 81–99)
MONOCYTES # BLD: 0.5 /CUMM (ref 0.1–0.6)
PLATELET # BLD: 239 /CUMM (ref 130–400)
PMV BLD AUTO: 8.1 FL (ref 7.4–10.4)
RED BLOOD CELL CT: 4.2 /CUMM (ref 4.2–5.4)
WBC # BLD AUTO: 7.9 /CUMM (ref 4.8–10.8)

## 2018-05-07 NOTE — RADIOLOGY REPORT
EXAMINATION:
XR TOES, RIGHT
 
CLINICAL INFORMATION:
Pain and swelling. Evaluate for fracture.
 
COMPARISON:
None
 
TECHNIQUE:
3 views of the right toes were obtained.
 
FINDINGS:
Diffuse, nonspecific soft tissue swelling of the visualized lower leg, ankle
and foot.
 
No soft tissue gas or radiopaque foreign body.
 
Bones have normal alignment. No evidence of acute fracture, subluxation,
osseous erosion or periostitis.
 
Small osteophytes are observed at the mildly degenerated first
tarsometatarsal joint and first metatarsophalangeal joint.
 
IMPRESSION:
 
1. No acute osseous injury within the right foot.
2. Diffuse, nonspecific soft tissue swelling of the visualized leg, ankle and
foot.
3. 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
tarsometatarsal joint.

## 2018-05-07 NOTE — ED ANKLE/FOOT INJURY COMPLAINT
History of Present Illness
 
General
Chief Complaint: Foot or Ankle Injury
Stated Complaint: FOOT PAIN, SENT BY TEJA
Source: patient, old records
Exam Limitations: no limitations
 
Vital Signs & Intake/Output
Vital Signs & Intake/Output
 Vital Signs
 
 
Date Time Temp Pulse Resp B/P B/P Pulse O2 O2 Flow FiO2
 
     Mean Ox Delivery Rate 
 
 1554 97.7 94 18 110/64  94 Room Air  
 
 
 
Allergies
Coded Allergies:
aspirin (NAUSEA 16)
shellfish derived (RED FACED 16)
 
Reconcile Medications
Albuterol Sulfate (Proair Hfa) 90 MCG HFA.AER.AD   2 PUF INH Q4-6 PRN PRN 
BREATHING PROBLEMS  (Reported)
Amoxicillin 250 MG CAPSULE   1 CAP PO TID CELLULITIS
Cholecalciferol (Vitamin D3) 1,000 UNIT TABLET   1 TAB PO DAILY SUPPLEMENT  (
Reported)
Colchicine 0.6 MG TABLET   1 TAB PO BID GOUT
Cyclobenzaprine HCl 10 MG TABLET   1 TAB PO BID MUSCLE SPASMS  (Reported)
Diltiazem HCl (Cardizem Cd) 180 MG CAP.ER.24H   1 CAP PO DAILY HEART  (Reported)
Furosemide 40 MG TABLET   1 TAB PO DAILY DIURETIC  (Reported)
Gabapentin 300 MG CAPSULE   1 CAP PO TID NERVE PAIN  (Reported)
Hyoscyamine (Levsin) 0.125 MG TABLET   1 TAB PO TID PRN ABD CRAMPS  (Reported)
Insulin Detemir (Levemir Flextouch) 100 UNIT/1 ML INSULN.PEN   30 UNITS SC QAM 
DIABETES  (Reported)
Latanoprost 2.5 ML DROPS   1 GTT OPH QPM BOTH EYES  (Reported)
Lisinopril 10 MG TABLET   1 TAB PO DAILY BP  (Reported)
Magnesium Oxide (Magnesium) 400 MG CAPSULE   1 CAP PO DAILY hypomagnesemia
Meloxicam 7.5 MG TABLET   1 TAB PO DAILY PAIN  (Reported)
Metoprolol Tartrate 100 MG TABLET   1 TAB PO BID HEART/BP  (Reported)
Mometasone Furoate (Nasonex) 17 GM SPRAY.PUMP   2 SPRAY NASB DAILY ALLERGIES  (
Reported)
Multivit-Min/Folic Acid/Biotin (Women Multivit W-Biotin Gummy) 200 MCG-300 MCG 
TAB.CHEW   1 TAB PO DAILY SUPPLEMENT  (Reported)
Omega-3 Fatty Acids/Fish Oil (Fish Oil 1,200 MG Softgel) 360 MG-1,200 MG CAPSULE
  1 CAP PO BID SUPPLEMENT  (Reported)
Oxycodone HCl/Acetaminophen (Percocet 5-325 MG Tablet) 5 MG-325 MG TABLET   1-2 
TAB PO Q6P PRN PAIN
Pantoprazole Sodium 40 MG TABLET.DR   1 TAB PO DAILY GI  (Reported)
Potassium Chloride 10 MEQ TABLET.ER   1 TAB PO DAILY SUPPLEMENT  (Reported)
Rosuvastatin Calcium (Crestor) 10 MG TABLET   1 TAB PO DAILY CHOLESTEROL  (
Reported)
Tramadol HCl (Tramadol HCl ER) 100 MG TAB.ER.24H   1 TAB PO DAILY PAIN  (
Reported)
Tramadol HCl 50 MG TABLET   1 TAB PO BIDP PRN PAIN
Vitamin E Mixed (Vitamin E) 400 UNIT TABLET   1 TAB PO DAILY SUPPLEMENT  (
Reported)
Warfarin Sodium 2 MG TABLET   1 TAB PO MoWeFr BLOOD THINNER  (Reported)
Warfarin Sodium (Coumadin) 1 MG TABLET   1 TAB PO SuTuThSa BLOOD THINNER  (
Reported)
 
Triage Note:
PT TO ER C/C RIGHT FOOT SWELLING AND PAIN X 4 DAYS
 ?CELLULITIS. AFEBRILE. WAS SEEN AT PAIN CLINIC
 EARLIER TODAY AND WAS ASSESSED.
Triage Nurses Notes Reviewed? yes
HPI:
Patient presents with pain, redness and swelling to her right pinky toe.  
Symptoms started approximately 3 days ago.  There is no known injury.  Patient 
states the pain is worse when she is up and walking or sitting and the pain 
decreases when she is laying down with her foot elevated.  There is no radiation
of the pain.  The pain is throbbing in nature.  Patient was seen by her pain 
management doctor as well as her cardiologist today and they both recommended 
that she come to the emergency department for evaluation.  Patient is on 
Coumadin and the last time
 
INR was checked was a few weeks ago and was normal and she is due to have 
another INR on Wednesday.  Patient is unsure if she is ever had any pain and 
swelling to her great toe.
 
Past History
 
Travel History
Traveled to [place] past 21 day No
 
Medical History
Any Pertinent Medical History? see below for history
Neurological: peripheral neuropathy
EENT: glaucoma, ABN AUDITORY PERCEPTION
Cardiovascular: AFIB, CHF, hypertension, hyperlipidemia
Respiratory: bronchitis, COPD
Gastrointestinal: GERD, irritable bowel syndrome
Hepatic: NONE
Renal: NONE
Musculoskeletal: chronic back pain, SHOULDER JOINT PAIN BILATERAL KNEE PAIN
Psychiatric: NONE
Endocrine: diabetes
Blood Disorders: NONE
Cancer(s): NONE
GYN/Reproductive: NONE
History of MRSA: No
History of VRE: No
History of CDIFF: No
Tetanus Vaccine: 13
 
Surgical History
Surgical History: non-contributory
 
Psychosocial History
Who do you live with Patient/Self
Services at Home None
What is your primary language English
Tobacco Use: Never used
ETOH Use: denies use
Illicit Drug Use: denies illicit drug use
 
Family History
Family History, If Any:
FATHER (tb, CHF).
MOTHER (pancreatic ca).
 
Hx Contributory? No
 
Review of Systems
 
Review of Systems
Constitutional:
Reports: no symptoms. 
Respiratory:
Reports: no symptoms. 
Cardiovascular:
Reports: no symptoms. 
GI:
Reports: no symptoms. 
Musculoskeletal:
Reports: see HPI. 
Neurological/Psychological:
Reports: no symptoms. 
Hematologic/Endocrine:
Reports: no symptoms. 
Immunologic/Allergic:
Reports: no symptoms. 
 
Physical Exam
 
Physical Exam
General Appearance: well developed/nourished, alert, awake, mild distress
Eyes:
Bilateral: PERRL, EOMI. 
Neck: normal inspection, supple, full range of motion
Cardiovascular/Respiratory: normal breath sounds, normal peripheral pulses, 
regular rate/rhythm, no respiratory distress
Leg/Knee/Thigh Left: normal range of motion, normal inspection
Leg/Knee/Thigh Right: normal range of motion, normal inspection
Foot Right: ERYTHEMA AND SWELLING TO RIGHT PINKY TOE.  tHERE IS NO TRACKING 
ERYTHEMA.
Neuro/Vascular: normal motor function, normal sensation
Psychiatric: awake, alert, oriented x 3
 
Progress
Differential Diagnosis: cellulitis, gout, sprain, contusion
Plan of Care:
 Orders
 
 
Procedure Date/time Status
 
BLOOD CULTURE  1536 Active
 
URIC ACID  1536 Complete
 
TROPONIN LEVEL  1536 Complete
 
LACTIC ACID  1536 Complete
 
WESTERGREN SED RATE  1536 Complete
 
C-REACTIVE PROTEIN  1536 Complete
 
COMPREHENSIVE METABOLIC PANEL  1536 Complete
 
CBC WITHOUT DIFFERENTIAL  1536 Complete
 
 
 Laboratory Tests
 
 
18 1836:
Lactic Acid Cancelled
 
18 1615:
CBC w Diff NO MAN DIFF REQ, RBC 4.20, .2  H, MCH 34.0  H, MCHC 33.0, RDW 
14.5, MPV 8.1, Gran % 72.1, Lymphocytes % 19.6  L, Monocytes % 6.9, Eosinophils 
% 0.8, Basophils % 0.6, Absolute Granulocytes 5.7, Absolute Lymphocytes 1.6, 
Absolute Monocytes 0.5, Absolute Eosinophils 0.1, Absolute Basophils 0, ESR 
Westergren 50  H
 
18 1605:
Anion Gap 19  H, Estimated GFR 45  L, BUN/Creatinine Ratio 22.5, Glucose 163  H,
Lactic Acid 1.9, Uric Acid 11.4  H, Calcium 10.1, Total Bilirubin 0.8, AST 30, 
ALT 21, Alkaline Phosphatase 110, Troponin I < 0.01, C-Reactive Prot, Quant 2.6 
H, Total Protein 7.4, Albumin 4.7, Globulin 2.7, Albumin/Globulin Ratio 1.7
 Microbiology
 1605  BLOOD: Blood Culture - RECD
 1536  BLOOD: Blood Culture - ORD
 
 
Diagnostic Imaging:
Viewed by Me: Radiology Read.  Discussed w/RAD: Radiology Read. 
Radiology Impression: PATIENT: FRANCI CHRISTIANSON  MEDICAL RECORD NO: [id number] PRESENT
AGE: 67  PATIENT ACCOUNT NO: [id number] : 52  LOCATION: [facility], [place] ORDERING 
PHYSICIAN: Bryce Larsen MD     SERVICE DATE:  EXAM TYPE: RAD 
- XRY-TOES, RIGHT EXAMINATION: XR TOES, RIGHT CLINICAL INFORMATION: Pain and 
swelling. Evaluate for fracture. COMPARISON: None TECHNIQUE: 3 views of the 
right toes were obtained. FINDINGS: Diffuse, nonspecific soft tissue swelling of
the visualized lower leg, ankle and foot. No soft tissue gas or radiopaque 
foreign body. Bones have normal alignment. No evidence of acute fracture, 
subluxation, osseous erosion or periostitis. Small osteophytes are observed at 
the mildly degenerated first tarsometatarsal joint and first metatarsophalangeal
joint. IMPRESSION: 1. No acute osseous injury within the right foot. 2. Diffuse,
nonspecific soft tissue swelling of the visualized leg, ankle and foot. 3. Mild 
osteoarthritis of the 1st metatarsophalangeal joint and 1st tarsometatarsal 
joint. DICTATED BY: Ryan Lerma MD  DATE/TIME DICTATED:18 
:RAD.[facility]  DATE/TIME TRANSCRIBED:18 CONFIDENTIAL, 
DO NOT COPY WITHOUT APPROPRIATE AUTHORIZATION.  <Electronically signed in Other 
Vendor System>                                                                  
                     SIGNED BY: Ryan Lerma MD 18
 
Departure
 
Departure
Disposition: HOME OR SELF CARE
Condition: Stable
Clinical Impression
Primary Impression: Cellulitis
Secondary Impressions: Gout
Referrals:
Bryce Nicole MD (PCP/Family)
 
Additional Instructions:
It is hard to tell if this is skin infection or if it is gout.  At this point it
is best if we treat for both.  Take the colchicine twice a day for 10 days, take
the antibiotic as prescribed.  Have her Coumadin level checked on Wednesday.  
Return if symptoms worsen or for any concerns.
Departure Forms:
Customer Survey
General Discharge Information
Prescriptions:
Current Visit Scripts
Amoxicillin 1 CAP PO TID  
     #30 CAP 
 
Colchicine 1 TAB PO BID  
     #20 TAB 
 
Oxycodone HCl/Acetaminophen (Percocet 5-325 MG Tablet) 1-2 TAB PO Q6P PRN PAIN 
     #16 TAB

## 2018-05-25 ENCOUNTER — HOSPITAL ENCOUNTER (INPATIENT)
Dept: HOSPITAL 68 - ERH | Age: 68
LOS: 3 days | DRG: 384 | End: 2018-05-28
Attending: INTERNAL MEDICINE | Admitting: INTERNAL MEDICINE
Payer: COMMERCIAL

## 2018-05-25 VITALS — BODY MASS INDEX: 49.79 KG/M2 | HEIGHT: 63 IN | WEIGHT: 281 LBS

## 2018-05-25 DIAGNOSIS — H40.9: ICD-10-CM

## 2018-05-25 DIAGNOSIS — Z79.1: ICD-10-CM

## 2018-05-25 DIAGNOSIS — E13.42: ICD-10-CM

## 2018-05-25 DIAGNOSIS — J44.9: ICD-10-CM

## 2018-05-25 DIAGNOSIS — K29.40: ICD-10-CM
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
## 2018-05-25 DIAGNOSIS — K21.9: ICD-10-CM

## 2018-05-25 DIAGNOSIS — I48.0: ICD-10-CM

## 2018-05-25 DIAGNOSIS — I95.9: ICD-10-CM

## 2018-05-25 DIAGNOSIS — M10.9: ICD-10-CM

## 2018-05-25 DIAGNOSIS — D62: ICD-10-CM

## 2018-05-25 DIAGNOSIS — K58.0: ICD-10-CM

## 2018-05-25 DIAGNOSIS — E11.22: ICD-10-CM

## 2018-05-25 DIAGNOSIS — D69.6: ICD-10-CM

## 2018-05-25 DIAGNOSIS — E78.5: ICD-10-CM

## 2018-05-25 DIAGNOSIS — Z72.89: ICD-10-CM

## 2018-05-25 DIAGNOSIS — E66.01: ICD-10-CM

## 2018-05-25 DIAGNOSIS — K92.2: ICD-10-CM

## 2018-05-25 DIAGNOSIS — D68.9: ICD-10-CM

## 2018-05-25 DIAGNOSIS — I50.9: ICD-10-CM

## 2018-05-25 DIAGNOSIS — K29.80: ICD-10-CM

## 2018-05-25 DIAGNOSIS — Z79.01: ICD-10-CM

## 2018-05-25 DIAGNOSIS — Z79.4: ICD-10-CM

## 2018-05-25 DIAGNOSIS — N17.9: ICD-10-CM

## 2018-05-25 DIAGNOSIS — N18.3: ICD-10-CM

## 2018-05-25 DIAGNOSIS — I13.0: ICD-10-CM

## 2018-05-25 DIAGNOSIS — K25.9: Primary | ICD-10-CM

## 2018-05-25 DIAGNOSIS — T39.395A: ICD-10-CM

## 2018-05-25 LAB
ABSOLUTE GRANULOCYTE CT: 2.6 /CUMM (ref 1.4–6.5)
APTT BLD: 57 SEC (ref 25–37)
BASOPHILS # BLD: 0 /CUMM (ref 0–0.2)
BASOPHILS NFR BLD: 0.8 % (ref 0–2)
EOSINOPHIL # BLD: 0.3 /CUMM (ref 0–0.7)
EOSINOPHIL NFR BLD: 6.5 % (ref 0–5)
ERYTHROCYTE [DISTWIDTH] IN BLOOD BY AUTOMATED COUNT: 15.7 % (ref 11.5–14.5)
GRANULOCYTES NFR BLD: 52 % (ref 42.2–75.2)
HCT VFR BLD CALC: 34.1 % (ref 37–47)
LYMPHOCYTES # BLD: 1.6 /CUMM (ref 1.2–3.4)
MCH RBC QN AUTO: 34.7 PG (ref 27–31)
MCHC RBC AUTO-ENTMCNC: 34.2 G/DL (ref 33–37)
MCV RBC AUTO: 101.6 FL (ref 81–99)
MONOCYTES # BLD: 0.4 /CUMM (ref 0.1–0.6)
PLATELET # BLD: 129 /CUMM (ref 130–400)
PMV BLD AUTO: 8.3 FL (ref 7.4–10.4)
PROTHROMBIN TIME: 60.4 SEC (ref 9.4–12.5)
RED BLOOD CELL CT: 3.35 /CUMM (ref 4.2–5.4)
WBC # BLD AUTO: 5.1 /CUMM (ref 4.8–10.8)

## 2018-05-25 PROCEDURE — G0480 DRUG TEST DEF 1-7 CLASSES: HCPCS

## 2018-05-25 NOTE — HISTORY & PHYSICAL
General Information and HPI
 
Allergies/Medications
Allergies:
Coded Allergies:
aspirin (NAUSEA 07/31/16)
colchicine (SEVERE DIARRHEA 05/25/18)
shellfish derived (RED FACED 07/31/16)
 
Home Med list
Albuterol Sulfate (Proair Hfa) 90 MCG HFA.AER.AD   2 PUF INH Q4-6 PRN PRN 
BREATHING PROBLEMS  (Reported)
Cholecalciferol (Vitamin D3) 1,000 UNIT TABLET   1 TAB PO DAILY SUPPLEMENT  (
Reported)
Cyclobenzaprine HCl 10 MG TABLET   1 TAB PO BID MUSCLE SPASMS  (Reported)
Diltiazem HCl (Cardizem Cd) 180 MG CAP.ER.24H   1 CAP PO DAILY HEART  (Reported)
Furosemide 40 MG TABLET   1 TAB PO DAILY DIURETIC  (Reported)
Gabapentin 300 MG CAPSULE   1 CAP PO TID NERVE PAIN  (Reported)
Hyoscyamine (Levsin) 0.125 MG TABLET   1 TAB PO TID PRN ABD CRAMPS  (Reported)
Insulin Detemir (Levemir Flextouch) 100 UNIT/1 ML INSULN.PEN   30 UNITS SC QAM 
DIABETES  (Reported)
Latanoprost 2.5 ML DROPS   1 GTT OPH QPM BOTH EYES  (Reported)
Lisinopril 10 MG TABLET   1 TAB PO DAILY BP  (Reported)
Magnesium Oxide (Magnesium) 400 MG CAPSULE   1 CAP PO DAILY hypomagnesemia
Meloxicam 7.5 MG TABLET   1 TAB PO DAILY PAIN  (Reported)
Metoprolol Tartrate 100 MG TABLET   1 TAB PO BID HEART/BP  (Reported)
Mometasone Furoate (Nasonex) 17 GM SPRAY.PUMP   2 SPRAY NASB DAILY ALLERGIES  (
Reported)
Multivit-Min/Folic Acid/Biotin (Women Multivit W-Biotin Gummy) 200 MCG-300 MCG 
TAB.CHEW   1 TAB PO DAILY SUPPLEMENT  (Reported)
Omega-3 Fatty Acids/Fish Oil (Fish Oil 1,200 MG Softgel) 360 MG-1,200 MG CAPSULE
  1 CAP PO BID SUPPLEMENT  (Reported)
Pantoprazole Sodium 40 MG TABLET.DR   1 TAB PO DAILY GI  (Reported)
Potassium Chloride 10 MEQ TABLET.ER   1 TAB PO DAILY SUPPLEMENT  (Reported)
Rosuvastatin Calcium (Crestor) 10 MG TABLET   1 TAB PO DAILY CHOLESTEROL  (
Reported)
Tramadol HCl (Tramadol HCl ER) 200 MG TAB.ER.24H   1 TAB PO QAM PAIN  (Reported)
Tramadol HCl 50 MG TABLET   1 TAB PO BIDP PRN PAIN
Vitamin E Mixed (Vitamin E) 400 UNIT TABLET   1 TAB PO DAILY SUPPLEMENT  (
Reported)
Warfarin Sodium 2 MG TABLET   1 TAB PO MoWeFr BLOOD THINNER  (Reported)
Warfarin Sodium (Coumadin) 1 MG TABLET   1 TAB PO SuTuThSa BLOOD THINNER  (
Reported)
 
 
Past History
 
Travel History
Traveled to [place] past 21 day No
 
Medical History
Neurological: peripheral neuropathy
EENT: glaucoma, ABN AUDITORY PERCEPTION
Cardiovascular: AFIB, CHF, hypertension, hyperlipidemia
Respiratory: bronchitis, COPD
Gastrointestinal: GERD, irritable bowel syndrome
Hepatic: NONE
Renal: NONE
Musculoskeletal: chronic back pain, SHOULDER JOINT PAIN BILATERAL KNEE PAIN
Psychiatric: NONE
Endocrine: diabetes
Blood Disorders: NONE
Cancer(s): NONE
GYN/Reproductive: NONE
History of MRSA: No
History of VRE: No
History of CDIFF: No
Tetanus Vaccine: 02/25/13
 
Surgical History
Surgical History: non-contributory
 
Past Family/Social History
 
Family History
Relations & Conditions if any
FATHER (tb, CHF).
MOTHER (pancreatic ca).
 
 
Psychosocial History
Services at Home: None
 
 
Core Measures/Misc (9/17)
 
Sepsis (View protocol)
If YES complete Sepsis Event Note If YES complete Sepsis Event Note

## 2018-05-25 NOTE — RADIOLOGY REPORT
EXAMINATION:
XR PORTABLE CHEST
 
CLINICAL INFORMATION:
Atrial fibrillation. Dyspnea
 
COMPARISON:
01/18/17
 
TECHNIQUE:
Portable frontal view of the chest was obtained.
 
FINDINGS:
Extreme upper chest excluded.
 
There may be some tortuosity of the aorta. The cardiac size is unchanged. The
left hilum is obscured. No change in the right hilum. Some increased density
in the right base medially is unchanged. The lower lung zones are difficult
to assess due to habitus and technique. No acute consolidation.
 
No pneumothorax.
 
IMPRESSION:
Limited assessment of the lower lungs. No definite acute pneumonia or edema.

## 2018-05-25 NOTE — HISTORY & PHYSICAL
Jose Luis CAMARILLO,[facility] 05/25/18 9696:
General Information and HPI
MD Statement:
I have seen and personally examined FRANCI CHRISTIANSON and documented this H&P.
 
The patient is a 67 year old F who presented with a patient stated chief 
complaint of GI bleed.
 
Source of Information: patient
Exam Limitations: no limitations
History of Present Illness:
The patient is a 53-year-old female with a past medical history of atrial 
fibrillation on coumadin, hypertension, hyperlipidemia, COPD, diabetes, CHF (EF 
55%, 2015), IBS diarrhea predominant, with a recent history of taking meloxicam 
and then Naproxen presents to [facility] after being sent by coumading clinic 
for evaluation of "bruises".  She reports that she called her Coumadin clinic 
regarding her worsening skin bruises, and when she was asked whether she was 
having any blood in stool or black tarry stools, she reported she has had about 
3 day history of dark stools.  The Coumadin clinic thenn instructed her to go to
[facility] for further evaluation.  Patient is not exactly clear on the details 
of her bowel movement, was moderately anxious during the interview.  She states 
that mostly her stools have been dark for the past three days, but also reported
in some incidents, there was some "streaks of blood" on both the tissue paper 
and on stools.  She denied any pain during bowel movements or any unusual or 
strong odor in her stools.  Denies any constipation and actually reports chronic
diarrhea from IBS, no abdominal pain,hematemesis, or increased heartburn (she is
on a PPI at home).
 
Patient has been seen by the GI service by Dr Horne for heart burn and abdominal
pain,  EGD done in 2013 was only remarkable for chronic duodenitis and chronic 
antral gastritis.  The same year she also underwent a colonoscopy  which was 
positive for 2 benign tubular adenoma and no invasive carcinoma.
 
Allergies/Medications
Allergies:
Coded Allergies:
aspirin (NAUSEA 07/31/16)
colchicine (SEVERE DIARRHEA 05/25/18)
shellfish derived (RED FACED 07/31/16)
 
Home Med list
Albuterol Sulfate (Proair Hfa) 90 MCG HFA.AER.AD   2 PUF INH Q4-6 PRN PRN 
BREATHING PROBLEMS  (Reported)
Cholecalciferol (Vitamin D3) 1,000 UNIT TABLET   1 TAB PO DAILY SUPPLEMENT  (
Reported)
Cyclobenzaprine HCl 10 MG TABLET   1 TAB PO BID MUSCLE SPASMS  (Reported)
Diltiazem HCl (Cardizem Cd) 180 MG CAP.ER.24H   1 CAP PO DAILY HEART  (Reported)
Furosemide 40 MG TABLET   1 TAB PO DAILY DIURETIC  (Reported)
Gabapentin 300 MG CAPSULE   1 CAP PO TID NERVE PAIN  (Reported)
Hyoscyamine (Levsin) 0.125 MG TABLET   1 TAB PO TID PRN ABD CRAMPS  (Reported)
Insulin Detemir (Levemir Flextouch) 100 UNIT/1 ML INSULN.PEN   30 UNITS SC QAM 
DIABETES  (Reported)
Latanoprost 2.5 ML DROPS   1 GTT OPH QPM BOTH EYES  (Reported)
Lisinopril 10 MG TABLET   1 TAB PO DAILY BP  (Reported)
Magnesium Oxide (Magnesium) 400 MG CAPSULE   1 CAP PO DAILY hypomagnesemia
Meloxicam 7.5 MG TABLET   1 TAB PO DAILY PAIN  (Reported)
Metoprolol Tartrate 100 MG TABLET   1 TAB PO BID HEART/BP  (Reported)
Mometasone Furoate (Nasonex) 17 GM SPRAY.PUMP   2 SPRAY NASB DAILY ALLERGIES  (
Reported)
Multivit-Min/Folic Acid/Biotin (Women Multivit W-Biotin Gummy) 200 MCG-300 MCG 
TAB.CHEW   1 TAB PO DAILY SUPPLEMENT  (Reported)
Omega-3 Fatty Acids/Fish Oil (Fish Oil 1,200 MG Softgel) 360 MG-1,200 MG CAPSULE
  1 CAP PO BID SUPPLEMENT  (Reported)
Pantoprazole Sodium 40 MG TABLET.DR   1 TAB PO DAILY GI  (Reported)
Potassium Chloride 10 MEQ TABLET.ER   1 TAB PO DAILY SUPPLEMENT  (Reported)
Rosuvastatin Calcium (Crestor) 10 MG TABLET   1 TAB PO DAILY CHOLESTEROL  (
Reported)
Tramadol HCl (Tramadol HCl ER) 200 MG TAB.ER.24H   1 TAB PO QAM PAIN  (Reported)
Tramadol HCl 50 MG TABLET   1 TAB PO BIDP PRN PAIN
Vitamin E Mixed (Vitamin E) 400 UNIT TABLET   1 TAB PO DAILY SUPPLEMENT  (
Reported)
Warfarin Sodium 2 MG TABLET   1 TAB PO MoWeFr BLOOD THINNER  (Reported)
Warfarin Sodium (Coumadin) 1 MG TABLET   1 TAB PO SuTuThSa BLOOD THINNER  (
Reported)
 
 
Past History
 
Travel History
Traveled to [place] past 21 day No
 
Medical History
Neurological: peripheral neuropathy
EENT: glaucoma, ABN AUDITORY PERCEPTION
Cardiovascular: AFIB, CHF, hypertension, hyperlipidemia
Respiratory: bronchitis, COPD
Gastrointestinal: GERD, irritable bowel syndrome
Hepatic: NONE
Renal: NONE
Musculoskeletal: chronic back pain, SHOULDER JOINT PAIN BILATERAL KNEE PAIN
Psychiatric: NONE
Endocrine: diabetes
Blood Disorders: NONE
Cancer(s): NONE
GYN/Reproductive: NONE
History of MRSA: No
History of VRE: No
History of CDIFF: No
Tetanus Vaccine: 02/25/13
 
Surgical History
Surgical History: non-contributory
 
Past Family/Social History
 
Family History
Relations & Conditions if any
FATHER (tb, CHF).
MOTHER (pancreatic ca).
 
 
Psychosocial History
Services at Home: None
 
Review of Systems
 
Review of Systems
Constitutional:
Reports: no symptoms. 
EENTM:
Reports: no symptoms. 
Cardiovascular:
Reports: edema. 
Respiratory:
Reports: short of breath. 
GI:
Reports: see HPI. 
Genitourinary:
Reports: no symptoms. 
Musculoskeletal:
Reports: joint swelling. 
Skin:
Reports: see HPI. 
Neurological/Psychological:
Reports: anxiety. 
Hematologic/Endocrine:
Reports: bruising. 
Immunologic/Allergic:
Reports: no symptoms. 
All Other Systems: Reviewed and Negative
 
Exam & Diagnostic Data
Last 24 Hrs of Vital Signs/I&O
 Vital Signs
 
 
Date Time Temp Pulse Resp B/P B/P Pulse O2 O2 Flow FiO2
 
     Mean Ox Delivery Rate 
 
05/25 2209      96 Room Air  
 
05/25 2208  76  129/74     
 
05/25 2137 97.2 76 18 133/69  96 Room Air  
 
 
 Intake & Output
 
 
 05/26 0800 05/26 0000 05/25 1600
 
Intake Total  0 
 
Output Total   
 
Balance  0 
 
    
 
Intake, IV  0 
 
Patient  120.202 kg 
 
Weight   
 
Weight  Estimated 
 
Measurement   
 
Method   
 
 
 
 
Physical Exam
General Appearance Alert, Oriented X3, Cooperative, anxious
Skin No Significant Lesion, multiple areas of echymosis on the abdominal area 
and lower back areas.
Skin Temp/Moisture Exam: Warm/Dry
Sepsis Skin Exam (color): Normal for Ethnicity
HEENT Atraumatic, Mucous Membr. moist/pink
Neck Supple, No JVD
Lymphatic Cervical nl
Cardiovascular Normal S1, Normal S2, No Murmurs
Lungs Clear to Auscultation
Abdomen Normal Bowel Sounds, Soft, No Tenderness
Neurological Normal Speech, Normal Tone, Sensation Intact
Extremities No Clubbing, No Cyanosis, No Edema, slight swelling of right great 
toe on the lateral side, no tenderness
Vascular dorsalis pedis not appreaciated. Posistive Posterior tibialis
 
Assessment/Plan
Assessment:
67-year-old lady with a history of diarrhea predominant IBS, on coumadin for 
afib, with current use of NSAIDs presents for evaluation of dark stools in the 
setting of supratherapeutic INR.  Hemodynamically stable, even though orthostats
positive, hemoglobin and hematocrit also stable.  No hematemesis or obvious 
BRBPR.
 
Impression
* GI bleed. History of dark stool in the context of NSAID use and a 
supratherapeutic INR is concerning for upper GI bleed from NSAID induced ulcer 
precipitated by the elevated INR. She has an  elevated BUN (eventhough prerenal 
azotemia could also be the cause, this patient does not appear to be dehydrated,
is euvolemic, and not intravasculary depleted). Therefore the elevated BUN gives
further credence to a more likely upper GI Bleed. Her Glascow- Blatchford score 
is 6 which warrants admission and endoscopy.  Another explanation of elevated 
BUN/creatinine ratio is prerenal azotemia from dehydration, however this patient
appears to be in a euvolemic state and no signs of intravascular depletion.
* Elevated INR of 5.5 which is  above the optimal therapeutic range of 2-3.  
Unclear whether patient is compliant with her INR checkups.  Her daughter does 
report that she drinks about 4-5 glasses of wine, school be of clinical 
significance as per to large amounts of intermittent alcohol consumption 
associated with elevated INR.
* SHANNAN as evident by an increment of 0.9 in creatinine in less than a month. 
Possibly pre-renal?
* Thrombocytopenia. Appears to be chronic. 
* Alcohol use.
* History of chronic diseases: Atrial fibrillation, IBS, gout, hypertension, 
diabetes, COPD, CHF.
 
 
Plan
Admit to ICU
Keep nothing by mouth
2 large IV bores
CBCs every 6 hours x1, then every 8h
Patient already typed and cross, will keep hemoglobin above 8 and transfuse if 
needed
D5NS at 100ml/hr (Pt has CHF, no recent echo, last one in 2015 with 55% EF)
Hold Coumadin
Status post vitamin K 10 mg oral, will recheck INR tomorrow morning and consider
FFP if above 1.8
Endoscopy tomorrow morning
Accu-Chek every 6
Nothing by mouth Novolin scale
Orthostats every shift
Hold off BP meds and furosemide in the setting of GI bleed and orthostatic 
positive, when okay to resume will start with beta blocker first
TRC nebs
Cardiology consult tomorrow morning to assess risk and benefit of Coumadin 
reversal and temporary cessation
CIWA with prn triggered dosing only
DVT prophylaxis: Alps
CODE STATUS: full code
 
As Ranked By This Provider
Problem List:
 1. GI bleed
 
 2. Elevated serum creatinine
 
 
Core Measures/Misc (9/17)
 
Acute Coronary Syndrome
ACS Diagnosis: No
 
Congestive Heart Failure
Congestive Heart Failure Diagnosis No
 
Cerebrovascular Accident
CVA/TIA Diagnosis: No
 
VTE (View Protocol)
VTE Risk Factors Acute Medical Illness
No Mechanical VTE Prophylaxis d/t N/A MechProphylax Ordered
No VTE Pharm Prophylaxis d/t Medical Contraindication
 
Sepsis (View protocol)
Sepsis Present: No
If YES complete Sepsis Event Note If YES complete Sepsis Event Note
 
 
Mendoza CAMARILLO, [facility] 05/26/18 0212:
Core Measures/Misc (9/17)
 
Sepsis (View protocol)
If YES complete Sepsis Event Note If YES complete Sepsis Event Note
 
Attending MD Review Statement
 
Attending Statement
Attending MD Statement: examined this patient, discuss w/resident/PA/NP, agreed 
w/resident/PA/NP, reviewed images, amended to note
Attending Assessment/Plan:
68 yo F with h/o Afib on coumadin, HTN, T2DM, COPD, CHF, CKD stage 3, 
diverticulosis, chronic diarrhea, who was sent in by her coumadin clinic nurse 
as patient had noticed multiple bruises over her abdomen and reports 3-day h/o 
dark stools with occasional blood in it. Patient had not checked her INR for a 
few weeks now. 
 
Patient was seen in the ER on May 7th for right foot 5th toe cellulitis/ gout 
which was treated with amoxicillin and colchicine. She developed profuse 
diarrhea probably due to amoxicillin but completed the course. Her PCP 
prescribed keflex which she did not take. For her pain, patient was taking 
meloxicam which was switched to naproxen 2 weeks back. Patient was scheduled to 
see Dr. Finney and get her INR checked 3 weeks ago but she missed her 
appointment as she came to the ER. 
 
Today, patient noted multiple bruises to her abdomen, in addition to the bruises
on the flanks (which are from insulin shots). She has also noted dark stools for
the last 3 days, no diarrhea or constipation. She has hemorrhoids and has noted 
intermittent bright red blood, but no clots. She denies heartburn or 
hematemesis. She reports lightheadedness, right sided chest discomfort, and 
progressively increasing exertional dyspnea. She denies palpitations, nausea, 
vomiting, abdominal pain. C/o worsening lower extremity edema despite being on 
lasix. Reports poor appetite, drinks gingerale or flavored water but rarely 
drinks water. 
 
EGD (2013): possible short segment Garcia's esophagus, mild nonerosive 
gastritis. Pathology chronic duodenitis. 
Colonoscopy (2013): colon polyps  tubular adenoma, pan-diverticulosis, internal
and external hemorrhoids.  
 
** Please note, daughter reported to resident that patient drinks 4-5 glasses of
wine everyday, whereas patient told us that she drinks one glass of wine 
everyday.
 
Vitals: afebrile, HR 60-70's, /72, sats 96% RA.
Orthostats: Lying 129/74 --> Sitting 114/71 --> 108/67.
Exam: AAO, anxious lady, stuttering speech, dry mouth, Chest b/l clear, Heart 
S1S2 irregular, Abd multiple ecchymotic patches noted. LE:b/l pedal edema+, 
peripheral pulses difficult to palpate. Right foot 5th toe is edematous but no 
warmth, erythema or tenderness. 
Rectal exam done in ER: dark stool, heme positive.
Labs: H/H 11.6/34.1 (14.3/43.3 on May 7th), macrocytic anemia, Plt 129, INR 5.45
, AG 20, BUN 40, creat 2.1 (baseline 0.9-1.2), glucose 140, LFTs normal.  
CXR: no consolidation or edema.
Echo (2015): EF 55%, LVH, mild pulmonary hypertension.      
EKG: Afib, Qtc 461.
 
Assessment and plan:
1. Melena
2. Upper GI bleed in the setting of NSAID use and elevated INR  DD are PUD, 
gastritis/ esophagitis.
3. Chronic atrial fibrillation on coumadin
4. Supratherapeutic INR, warfarin induced coagulopathy
5. Acute blood loss anemia
6. Orthostatic hypotension
7. History of hemorrhoids
8. SHANNAN on chronic kidney disease stage 3
9. Thrombocytopenia ?etiology ?chronic
10. Alcohol use
 
- Admit to ICU
- Type and crossmatch, CBC Q8 hourly
- Transfuse for Hb < 8.0
- Check troponin, iron studies, B12, TSH, folic acid
- GI consult
- IV protonix drip
- NPO for possible EGD in AM
- CRCU consult
- Avoid NSAIDs
- Hold coumadin
- Gentle IV hydration
- Recheck orthostats in AM
- Trend renal functions, check urinalysis for proteinuria, urine lytes and urine
tox screen
- Vitamin K 10 mg PO given in ER
- Recheck INR in AM, patient will need FFP's in AM to reverse INR to a goal of <
1.8 for GI to scope her
- Consult Cardiology
- Recheck EKG and troponin in AM
- Resume metoprolol in AM and if BP is stable resume cardizem
- Hold lasix and lisinopril
- Check alcohol level
- CIWA score, IV ativan per CIWA
- Diabetes management  check HbA1c, insulin NPO SS. 
DVT ppx Alps/ supratherapeutic INR. Full code.
** Resident spoke with daughter Kelly.
 
TTS > 45 mins

## 2018-05-25 NOTE — ED GI/GU/ABDOMINAL COMPLAINT
History of Present Illness
 
General
Chief Complaint: Abdominal Pain/Flank Pain
Stated Complaint: DARK STOOLS
Source: patient, EMS
Exam Limitations: no limitations
 
Vital Signs & Intake/Output
Vital Signs & Intake/Output
 Vital Signs
 
 
Date Time Temp Pulse Resp B/P B/P Pulse O2 O2 Flow FiO2
 
     Mean Ox Delivery Rate 
 
2209      96 Room Air  
 
2208  76  129/74     
 
2137 97.2 76 18 133/69  96 Room Air  
 
 
 
Allergies
Coded Allergies:
aspirin (NAUSEA 16)
colchicine (SEVERE DIARRHEA 18)
shellfish derived (RED FACED 16)
 
Reconcile Medications
Albuterol Sulfate (Proair Hfa) 90 MCG HFA.AER.AD   2 PUF INH Q4-6 PRN PRN 
BREATHING PROBLEMS  (Reported)
Cholecalciferol (Vitamin D3) 1,000 UNIT TABLET   1 TAB PO DAILY SUPPLEMENT  (
Reported)
Cyclobenzaprine HCl 10 MG TABLET   1 TAB PO BID MUSCLE SPASMS  (Reported)
Diltiazem HCl (Cardizem Cd) 180 MG CAP.ER.24H   1 CAP PO DAILY HEART  (Reported)
Furosemide 40 MG TABLET   1 TAB PO DAILY DIURETIC  (Reported)
Gabapentin 300 MG CAPSULE   1 CAP PO TID NERVE PAIN  (Reported)
Hyoscyamine (Levsin) 0.125 MG TABLET   1 TAB PO TID PRN ABD CRAMPS  (Reported)
Insulin Detemir (Levemir Flextouch) 100 UNIT/1 ML INSULN.PEN   30 UNITS SC QAM 
DIABETES  (Reported)
Latanoprost 2.5 ML DROPS   1 GTT OPH QPM BOTH EYES  (Reported)
Lisinopril 10 MG TABLET   1 TAB PO DAILY BP  (Reported)
Magnesium Oxide (Magnesium) 400 MG CAPSULE   1 CAP PO DAILY hypomagnesemia
Meloxicam 7.5 MG TABLET   1 TAB PO DAILY PAIN  (Reported)
Metoprolol Tartrate 100 MG TABLET   1 TAB PO BID HEART/BP  (Reported)
Mometasone Furoate (Nasonex) 17 GM SPRAY.PUMP   2 SPRAY NASB DAILY ALLERGIES  (
Reported)
Multivit-Min/Folic Acid/Biotin (Women Multivit W-Biotin Gummy) 200 MCG-300 MCG 
TAB.CHEW   1 TAB PO DAILY SUPPLEMENT  (Reported)
Omega-3 Fatty Acids/Fish Oil (Fish Oil 1,200 MG Softgel) 360 MG-1,200 MG CAPSULE
  1 CAP PO BID SUPPLEMENT  (Reported)
Pantoprazole Sodium 40 MG TABLET.DR   1 TAB PO DAILY GI  (Reported)
Potassium Chloride 10 MEQ TABLET.ER   1 TAB PO DAILY SUPPLEMENT  (Reported)
Rosuvastatin Calcium (Crestor) 10 MG TABLET   1 TAB PO DAILY CHOLESTEROL  (
Reported)
Tramadol HCl (Tramadol HCl ER) 200 MG TAB.ER.24H   1 TAB PO QAM PAIN  (Reported)
Tramadol HCl 50 MG TABLET   1 TAB PO BIDP PRN PAIN
Vitamin E Mixed (Vitamin E) 400 UNIT TABLET   1 TAB PO DAILY SUPPLEMENT  (
Reported)
Warfarin Sodium 2 MG TABLET   1 TAB PO MoWeFr BLOOD THINNER  (Reported)
Warfarin Sodium (Coumadin) 1 MG TABLET   1 TAB PO SuTuThSa BLOOD THINNER  (
Reported)
 
Triage Nurses Notes Reviewed? yes
Pregnant? N
Is pt currently breastfeeding? No
Onset: Gradual
Duration: day(s):, waxing and waning
Timing: recent history
Radiation: no radiation
Modifying Factors:
Worsens With: other (DARK STOOL). 
Associated Symptoms: WEAKNESS
HPI:
68 yo woman, h/o afib, on coumadin, 
presents with weakness and dark stool for the past 2-3 days.
 
She notes, "I noticed the dark stool.. and I've been feeling a bit weak.... 
otherwise I'm okay."
 
No chest pain, shortness of breath, abdominal pain, chest pain. 
 
Past History
 
Medical History
Any Pertinent Medical History? see below for history
Neurological: peripheral neuropathy
EENT: glaucoma, ABN AUDITORY PERCEPTION
Cardiovascular: AFIB, CHF, hypertension, hyperlipidemia
Respiratory: bronchitis, COPD
Gastrointestinal: GERD, irritable bowel syndrome
Hepatic: NONE
Renal: NONE
Musculoskeletal: chronic back pain, SHOULDER JOINT PAIN BILATERAL KNEE PAIN
Psychiatric: NONE
Endocrine: diabetes
Blood Disorders: NONE
Cancer(s): NONE
GYN/Reproductive: NONE
History of MRSA: No
History of VRE: No
History of CDIFF: No
Tetanus Vaccine: 13
 
Surgical History
Surgical History: non-contributory
 
Psychosocial History
Who do you live with Patient/Self
Services at Home None
What is your primary language English
 
Family History
Family History, If Any:
FATHER (tb, CHF).
MOTHER (pancreatic ca).
 
Hx Contributory? No
 
Review of Systems
 
Review of Systems
Constitutional:
Reports: no symptoms. 
EENTM:
Reports: no symptoms. 
Respiratory:
Reports: no symptoms. 
Cardiovascular:
Reports: no symptoms. 
GI:
Reports: no symptoms. 
Genitourinary:
Reports: no symptoms. 
Musculoskeletal:
Reports: no symptoms. 
Skin:
Reports: no symptoms. 
Neurological/Psychological:
Reports: no symptoms. 
Hematologic/Endocrine:
Reports: no symptoms. 
Immunologic/Allergic:
Reports: no symptoms. 
All Other Systems: Reviewed and Negative
 
Physical Exam
 
Physical Exam
General Appearance: well developed/nourished, no apparent distress
Head: atraumatic, normal appearance
Eyes:
Bilateral: normal appearance. 
Ears, Nose, Throat, Mouth: hearing grossly normal, moist mucous membrane
Gastrointestinal: normal bowel sounds, soft, non-tender, no organomegaly
Comments:
Review of Systems - except as otherwise noted in HPI
 
Review of Systems
Constitutional:no symptoms. 
EENTM:no symptoms. 
Respiratory:no symptoms. 
Cardiovascular:no symptoms. 
GI:no symptoms. 
Genitourinary:no symptoms. 
Musculoskeletal:no symptoms. 
Skin:no symptoms. 
Neurological/Psychological:no symptoms. 
Hematologic/Endocrine:no symptoms. 
Immunologic/Allergic:no symptoms. 
All Other Systems: Reviewed and Negative
 
Physical Exam
 
Physical Exam
General Appearance: well developed/nourished, no apparent distress
Head: atraumatic, normal appearance
Eyes:
Bilateral: normal appearance. 
Ears, Nose, Throat: normal pharynx, normal ENT inspection
Neck: normal inspection, supple, full range of motion
Respiratory: normal breath sounds, chest non-tender, no respiratory distress, 
quiet respiration, lungs clear
Cardiovascular: regular rate/rhythm
Gastrointestinal: normal bowel sounds, soft, non-tender, no organomegaly
Back: normal inspection, normal range of motion
Extremities: normal inspection, normal capillary refill, normal range of motion,
no edema
Neurologic/Psych: no motor/sensory deficits, awake, alert, oriented x 3
Skin: intact, normal color, warm/dry
Rectal exam: dark stook, guiac +
 
Core Measures
ACS in differential dx? No
Sepsis Present: No
Sepsis Focused Exam Completed? No
 
Progress
Differential Diagnosis: lower gi bleed vs other. 
Plan of Care:
 Orders
 
 
Procedure Date/time Status
 
Nothing by Mouth  B Active
 
Saline Lock 2248 Active
 
Misc Message 2248 Active
 
ED Holding Orders 2248 Active
 
Admit to inpatient 2248 Active
 
Vital Signs 2248 Active
 
Code Status 2248 Active
 
MISTAKE 2153 Active
 
PARTIAL THROMBOPLASTIN TIME 2127 Complete
 
PROTHROMBIN TIME 2127 Complete
 
COMPREHENSIVE METABOLIC PANEL 2127 Complete
 
CBC WITHOUT DIFFERENTIAL 2127 Complete
 
EKG 2127 Active
 
TYPE & SCREEN (NOT X-MATCH) 2127 Complete
 
 
 Current Medications
 
 
  Sig/Andrea Start time  Last
 
Medication Dose  Stop Time Status Admin
 
Pantoprazole Sodium 40 MG Q5H  2300 UNVr 
 
(Protonix)     
 
Sodium Chloride 100 ML    
 
(Normal Saline 0.9%)     
 
Sodium Chloride 1,000 ML BOLUS ONE  2245 AC 
 
(Normal Saline 0.9%)    2344  
 
 
 Laboratory Tests
 
 
 
18:
Anion Gap 20  H, Estimated GFR 24  L, BUN/Creatinine Ratio 19.0, Glucose 140  H,
Calcium 9.3, Total Bilirubin 0.8, AST 33, ALT 23, Alkaline Phosphatase 91, Total
Protein 6.3, Albumin 3.8, Globulin 2.5, Albumin/Globulin Ratio 1.5, PT 60.4 *H, 
INR 5.45 *H, APTT 57  H, CBC w Diff NO MAN DIFF REQ, RBC 3.35  L, .6  H, 
MCH 34.7  H, MCHC 34.2, RDW 15.7  H, MPV 8.3, Gran % 52.0, Lymphocytes % 32.0, 
Monocytes % 8.7, Eosinophils % 6.5  H, Basophils % 0.8, Absolute Granulocytes 
2.6, Absolute Lymphocytes 1.6, Absolute Monocytes 0.4, Absolute Eosinophils 0.3,
Absolute Basophils 0
 
Diagnostic Imaging:
Viewed by Me: Radiology Read.  Discussed w/RAD: Radiology Read. 
CXR Impression: PATIENT: FRANCI CHRISTIANSON  MEDICAL RECORD NO: [id number] PRESENT AGE: 
67  PATIENT ACCOUNT NO: [id number] : 52  LOCATION: [facility] ORDERING PHYSICIAN:
Solo Borrero MD     SERVICE DATE:  EXAM TYPE: RAD - XRY-
PORTABLE CHEST XRAY EXAMINATION: XR PORTABLE CHEST CLINICAL INFORMATION: Atrial 
fibrillation. Dyspnea COMPARISON: 17 TECHNIQUE: Portable frontal view of 
the chest was obtained. FINDINGS: Extreme upper chest excluded. There may be 
some tortuosity of the aorta. The cardiac size is unchanged. The left hilum is 
obscured. No change in the right hilum. Some increased density in the right base
medially is unchanged. The lower lung zones are difficult to assess due to 
habitus and technique. No acute consolidation. No pneumothorax. IMPRESSION: 
Limited assessment of the lower lungs. No definite acute pneumonia or edema. 
DICTATED BY: Gregg Medley MD  DATE/TIME DICTATED:18 
:RAD.[email or web address]  DATE/TIME TRANSCRIBED:18 CONFIDENTIAL, 
DO NOT COPY WITHOUT APPROPRIATE AUTHORIZATION.  <Electronically signed in Other 
Vendor System>                                                                  
                     SIGNED BY: Gregg Medley MD 18
Initial ED EKG: afib, no acute changes. 
 
Departure
 
Departure
Disposition: STILL A PATIENT
Condition: Stable
Clinical Impression
Primary Impression: GI bleed
Secondary Impressions: Elevated serum creatinine
Referrals:
Bryce Nicole MD (PCP/Family)
 
Departure Forms:
Customer Survey
General Discharge Information
Comments
18, 23:01...discussed with dr. mccormick (GI), pt to be placed on protonix 
gtt, likely endoscopy in the AM.  
 
Admission Note
Spoke With:
Mendoza CAMARILLO,[facility]
Documentation of Exam:
Documentation of any treatments & extenuating circumstances including Concerns 
Regarding Discharge (functional status, medication knowledge or non-compliance, 
living conditions, etc.) that warrant an admission rather than observation: 
 
pt with lower gi bleed on coumadin for afib, hct stable, pt orthostatic, barely,
with stable BP... pt to be placed in ICU for likely endoscopy in the AM. 
 
 
Critical Care Note
 
Critical Care Note
Critical Care Time: 30-74 min

## 2018-05-26 VITALS — DIASTOLIC BLOOD PRESSURE: 74 MMHG | SYSTOLIC BLOOD PRESSURE: 110 MMHG

## 2018-05-26 VITALS — DIASTOLIC BLOOD PRESSURE: 78 MMHG | SYSTOLIC BLOOD PRESSURE: 138 MMHG

## 2018-05-26 VITALS — SYSTOLIC BLOOD PRESSURE: 131 MMHG | DIASTOLIC BLOOD PRESSURE: 82 MMHG

## 2018-05-26 VITALS — DIASTOLIC BLOOD PRESSURE: 74 MMHG | SYSTOLIC BLOOD PRESSURE: 115 MMHG

## 2018-05-26 VITALS — DIASTOLIC BLOOD PRESSURE: 68 MMHG | SYSTOLIC BLOOD PRESSURE: 110 MMHG

## 2018-05-26 VITALS — DIASTOLIC BLOOD PRESSURE: 72 MMHG | SYSTOLIC BLOOD PRESSURE: 116 MMHG

## 2018-05-26 VITALS — SYSTOLIC BLOOD PRESSURE: 118 MMHG | DIASTOLIC BLOOD PRESSURE: 74 MMHG

## 2018-05-26 VITALS — DIASTOLIC BLOOD PRESSURE: 70 MMHG | SYSTOLIC BLOOD PRESSURE: 110 MMHG

## 2018-05-26 LAB
ABSOLUTE GRANULOCYTE CT: 1.6 /CUMM (ref 1.4–6.5)
ABSOLUTE GRANULOCYTE CT: 1.9 /CUMM (ref 1.4–6.5)
APTT BLD: 46 SEC (ref 25–37)
BASOPHILS # BLD: 0 /CUMM (ref 0–0.2)
BASOPHILS # BLD: 0 /CUMM (ref 0–0.2)
BASOPHILS NFR BLD: 0.7 % (ref 0–2)
BASOPHILS NFR BLD: 0.8 % (ref 0–2)
EOSINOPHIL # BLD: 0.3 /CUMM (ref 0–0.7)
EOSINOPHIL # BLD: 0.4 /CUMM (ref 0–0.7)
EOSINOPHIL NFR BLD: 8.3 % (ref 0–5)
EOSINOPHIL NFR BLD: 8.5 % (ref 0–5)
ERYTHROCYTE [DISTWIDTH] IN BLOOD BY AUTOMATED COUNT: 15.6 % (ref 11.5–14.5)
ERYTHROCYTE [DISTWIDTH] IN BLOOD BY AUTOMATED COUNT: 15.8 % (ref 11.5–14.5)
GRANULOCYTES NFR BLD: 41.1 % (ref 42.2–75.2)
GRANULOCYTES NFR BLD: 45.6 % (ref 42.2–75.2)
HCT VFR BLD CALC: 30.7 % (ref 37–47)
HCT VFR BLD CALC: 31.2 % (ref 37–47)
LYMPHOCYTES # BLD: 1.2 /CUMM (ref 1.2–3.4)
LYMPHOCYTES # BLD: 1.9 /CUMM (ref 1.2–3.4)
MCH RBC QN AUTO: 34.6 PG (ref 27–31)
MCH RBC QN AUTO: 34.8 PG (ref 27–31)
MCHC RBC AUTO-ENTMCNC: 33.8 G/DL (ref 33–37)
MCHC RBC AUTO-ENTMCNC: 34.2 G/DL (ref 33–37)
MCV RBC AUTO: 101.6 FL (ref 81–99)
MCV RBC AUTO: 102.5 FL (ref 81–99)
MONOCYTES # BLD: 0.3 /CUMM (ref 0.1–0.6)
MONOCYTES # BLD: 0.5 /CUMM (ref 0.1–0.6)
PLATELET # BLD: 106 /CUMM (ref 130–400)
PLATELET # BLD: 94 /CUMM (ref 130–400)
PMV BLD AUTO: 8.4 FL (ref 7.4–10.4)
PMV BLD AUTO: 8.6 FL (ref 7.4–10.4)
PROTHROMBIN TIME: 22.9 SEC (ref 9.4–12.5)
PROTHROMBIN TIME: 29.7 SEC (ref 9.4–12.5)
PROTHROMBIN TIME: 63 SEC (ref 9.4–12.5)
RED BLOOD CELL CT: 3.02 /CUMM (ref 4.2–5.4)
RED BLOOD CELL CT: 3.04 /CUMM (ref 4.2–5.4)
WBC # BLD AUTO: 3.5 /CUMM (ref 4.8–10.8)
WBC # BLD AUTO: 4.7 /CUMM (ref 4.8–10.8)

## 2018-05-26 PROCEDURE — 30233K1 TRANSFUSION OF NONAUTOLOGOUS FROZEN PLASMA INTO PERIPHERAL VEIN, PERCUTANEOUS APPROACH: ICD-10-PCS | Performed by: INTERNAL MEDICINE

## 2018-05-26 NOTE — ADMISSION CERTIFICATION
Admission Certification
 
Certification Statement
- As attending physician, I certify that at the time of
- admission, based on clinical presentation, severity of
- symptoms, need for further diagnostic testing and
- therapeutic interventions, and risk of adverse outcomes
- without in-hospital treatment, in my clinical assessment,
- this patient requires an acute hospital stay for a minimum
- of two nights or longer. I have also considered psychsocial
- factors such as support system, advanced age, financial
- issues, cognitive issues, and failed out-patient treatments,
- past re-admission history, safety of patient, and lack of
- compliance as applicable.
Specific rationale supporting this admission is:
Melena, upper GI bleed, acute blood loss anemia, orthostatic hypotension and 
supratherapeutic INR on coumadin. SHANNAN on CKD.

## 2018-05-26 NOTE — CONS- GASTROENTEROLOGY
General Information and HPI
 
Consulting Request
Date of Consult: 05/26/18
Requested By:
Mendoza CAMARILLO,Aaron
 
Reason for Consult:
1.  Acute blood loss anemia
2.  Melena
3.  Long-term use of anticoagulants
4.  Coagulopathy
Source of Information: patient, electronic medical record
Exam Limitations: no limitations
History of Present Illness:
Ms. Choudhury is a 67-year-old female with a past medical history of atrial 
fibrillation for which she takes Coumadin.  She had noticed increased bruising 
across her abdomen and called the coumadin clinic and was told to come to the 
ED.  Upon arrival in the ED she was found to have an elevated PT/INR of 63.0/
5.6.  She had no acute drop in her H&H which was 11.6/34.1, however, the next 
morning it had dropped to 10.5/31.2.  Of note her MCV is 101.6.
 
Ms. Choudhury had recently been taking both Meloxicam and Naproxen.  She also 
reported dark, but not clearly melenic stools.  She denied nausea, vomiting, 
hematemesis, abdominal pain.  She had had no fever or shaking chills.  She 
denies syncope, presyncope, palpitations or shortness of breath.  She last had 
an EGD and colonoscopy by Dr. Horne in 2013 which showed mild gastritis and 
several small adenomatous polyps.  Gastric biopsies were negative for H. Pylori.
 
Additional  past medical history of significance includes hypertension, 
hyperlipidemia, COPD, diabetes, CHF (EF 55%, 2015), diarrhea-predominant IBS.
 
 
Allergies/Medications
Allergies:
Coded Allergies:
aspirin (NAUSEA 07/31/16)
colchicine (SEVERE DIARRHEA 05/25/18)
shellfish derived (RED FACED 07/31/16)
 
Home Med List:
Albuterol Sulfate (Proair Hfa) 90 MCG HFA.AER.AD   2 PUF INH Q4-6 PRN PRN 
BREATHING PROBLEMS  (Reported)
Cholecalciferol (Vitamin D3) 1,000 UNIT TABLET   1 TAB PO DAILY SUPPLEMENT  (
Reported)
Cyclobenzaprine HCl 10 MG TABLET   1 TAB PO BID MUSCLE SPASMS  (Reported)
Diltiazem HCl (Cardizem Cd) 180 MG CAP.ER.24H   1 CAP PO DAILY HEART  (Reported)
Furosemide 40 MG TABLET   1 TAB PO DAILY DIURETIC  (Reported)
Gabapentin 300 MG CAPSULE   1 CAP PO TID NERVE PAIN  (Reported)
Hyoscyamine (Levsin) 0.125 MG TABLET   1 TAB PO TID PRN ABD CRAMPS  (Reported)
Insulin Detemir (Levemir Flextouch) 100 UNIT/1 ML INSULN.PEN   30 UNITS SC QAM 
DIABETES  (Reported)
Latanoprost 2.5 ML DROPS   1 GTT OPH QPM BOTH EYES  (Reported)
Lisinopril 10 MG TABLET   1 TAB PO DAILY BP  (Reported)
Magnesium Oxide (Magnesium) 400 MG CAPSULE   1 CAP PO DAILY hypomagnesemia
Meloxicam 7.5 MG TABLET   1 TAB PO DAILY PAIN  (Reported)
Metoprolol Tartrate 100 MG TABLET   1 TAB PO BID HEART/BP  (Reported)
Mometasone Furoate (Nasonex) 17 GM SPRAY.PUMP   2 SPRAY NASB DAILY ALLERGIES  (
Reported)
Multivit-Min/Folic Acid/Biotin (Women Multivit W-Biotin Gummy) 200 MCG-300 MCG 
TAB.CHEW   1 TAB PO DAILY SUPPLEMENT  (Reported)
Omega-3 Fatty Acids/Fish Oil (Fish Oil 1,200 MG Softgel) 360 MG-1,200 MG CAPSULE
  1 CAP PO BID SUPPLEMENT  (Reported)
Pantoprazole Sodium 40 MG TABLET.DR   1 TAB PO DAILY GI  (Reported)
Potassium Chloride 10 MEQ TABLET.ER   1 TAB PO DAILY SUPPLEMENT  (Reported)
Rosuvastatin Calcium (Crestor) 10 MG TABLET   1 TAB PO DAILY CHOLESTEROL  (
Reported)
Tramadol HCl (Tramadol HCl ER) 200 MG TAB.ER.24H   1 TAB PO QAM PAIN  (Reported)
Tramadol HCl 50 MG TABLET   1 TAB PO BIDP PRN PAIN
Vitamin E Mixed (Vitamin E) 400 UNIT TABLET   1 TAB PO DAILY SUPPLEMENT  (
Reported)
Warfarin Sodium 2 MG TABLET   1 TAB PO MoWeFr BLOOD THINNER  (Reported)
Warfarin Sodium (Coumadin) 1 MG TABLET   1 TAB PO SuTuThSa BLOOD THINNER  (
Reported)
 
Current Medications:
 Current Medications
 
 
  Sig/Andrea Start time  Last
 
Medication Dose Route Stop Time Status Admin
 
Acetaminophen 500 MG ONCE ONE 05/26 1245 DC 05/26
 
  IV 05/26 1246  1257
 
Albuterol Sulfate 2 PUF Q4P PRN 05/26 1115 AC 
 
  INH   
 
Dextrose/Sodium  1,000 ML Q13H 05/25 2345 DC 05/26
 
Chloride  IV 05/26 0944  0129
 
Diltiazem HCl 180 MG DAILY 05/26 1600 AC 
 
  PO   
 
Gabapentin 300 MG TID 05/26 0900 AC 05/26
 
  PO   1539
 
Insulin Human Regular 0 Q6 05/25 2359 AC 05/26
 
  SC   1201
 
Lorazepam 0 Q1P PRN 05/26 0230 AC 
 
  IV   
 
Lorazepam 0.5 MG ONCE ONE 05/26 0145 DC 05/26
 
  PO 05/26 0146  0155
 
Lorazepam 2 MG Q2P PRN 05/26 0045 DC 
 
  IV   
 
Lorazepam 1 MG Q2P PRN 05/26 0045 DC 
 
  IV   
 
Magnesium Oxide 400 MG ONCE ONE 05/26 0900 DC 05/26
 
  PO 05/26 0901  0905
 
Pantoprazole Sodium 40 MG .STK-MED ONE 05/26 0254 DC 
 
  IV 05/26 0255  
 
Pantoprazole Sodium 0 .STK-MED ONE 05/25 2339 DC 
 
  IV   
 
Pantoprazole Sodium 0 .STK-MED ONE 05/25 2338 DC 
 
  IV   
 
Pantoprazole Sodium 80 MG ONCE ONE 05/25 2300 DC 05/25
 
  IV 05/25 2301  2335
 
Pantoprazole Sodium 40 MG Q5H 05/25 2300 AC 05/26
 
Sodium Chloride 100 ML IV   1515
 
Phytonadione 10 MG ONCE ONE 05/25 2245 DC 05/25
 
  PO 05/25 2246  2340
 
Sodium Chloride 1,000 ML BOLUS ONE 05/25 2245 DC 05/26
 
  IV 05/25 2344  0007
 
Tramadol HCl 50 MG BID PRN 05/26 2100 AC 
 
  PO   
 
 
 
 
Past History
 
Travel History
Traveled to [place] past 21 day No
 
Medical History
Blood Transfusion Hx: No
Neurological: peripheral neuropathy
EENT: glaucoma, ABN AUDITORY PERCEPTION
Cardiovascular: AFIB, CHF, hypertension, hyperlipidemia
Respiratory: bronchitis, COPD
Gastrointestinal: GERD, irritable bowel syndrome
Hepatic: NONE
Renal: NONE
Musculoskeletal: chronic back pain, SHOULDER JOINT PAIN BILATERAL KNEE PAIN
Psychiatric: NONE
Endocrine: diabetes
Blood Disorders: NONE
Cancer(s): NONE
GYN/Reproductive: NONE
 
Surgical History
Surgical History: non-contributory
 
Family History
Relations & Conditions If Any:
FATHER (tb, CHF).
MOTHER (pancreatic ca).
 
 
Psychosocial History
Where Do You Live? Home
Services at Home: None
Smoking Status: Never Smoked
 
Review of Systems
 
Review of Systems
Constitutional:
Reports: weakness.  Denies: unexplained weight loss. 
EENTM:
Reports: no symptoms. 
Cardiovascular:
Reports: no symptoms. 
Respiratory:
Reports: no symptoms. 
GI:
Reports: see HPI. 
Genitourinary:
Reports: no symptoms. 
Musculoskeletal:
Reports: no symptoms. 
Skin:
Reports: see HPI. 
Neurological/Psychological:
Reports: no symptoms. 
Hematologic/Endocrine:
Reports: bruising. 
 
Exam & Diagnostic Data
Vital Signs and I&O
Vital Signs
 
 
Date Time Temp Pulse Resp B/P B/P Pulse O2 O2 Flow FiO2
 
     Mean Ox Delivery Rate 
 
05/26 1400  78 26 116/72     
 
05/26 1200 97.9 72 24 110/74     
 
05/26 1108       Room Air Room Air 
 
05/26 0800      92 Room Air  
 
05/26 0800 98.2 69 18 110/70  92 Room Air  
 
05/26 0600 97.5 71 20 110/68     
 
05/26 0400 97.5 66 16 110/68     
 
05/26 0400      95 Room Air  
 
05/26 0138      96 Room Air  
 
05/26 0130 97.9 62 13 115/72     
 
05/26 0130 97.9 62 18 132/74  96 Room Air  
 
05/25 2209      96 Room Air  
 
05/25 2208  76  129/74     
 
05/25 2137 97.2 76 18 133/69  96 Room Air  
 
 
 Intake & Output
 
 
 05/26 1600 05/26 0400 05/25 1600 05/25 0400 05/24 1600 05/24 0400
 
Intake Total 1625.6 0    
 
Output Total 900     
 
Balance 725.6 0    
 
       
 
Intake, Blood 340     
 
Product      
 
Intake, IV 1285.6 0    
 
Intake, Oral 0     
 
Number 0     
 
Bowel      
 
Movements      
 
Output, Urine 900     
 
Patient  288 lb    
 
Weight      
 
Weight  Bed scale    
 
Measurement      
 
Method      
 
 
 
 
Physical Exam
General Appearance: alert, awake, comfortable
Head: atraumatic, normal appearance
Eyes:
Bilateral: normal appearance. 
Ears, Nose, Throat: hearing grossly normal
Neck: supple, full range of motion
Respiratory: normal breath sounds, lungs clear
Cardiovascular: regular rate/rhythm, Normal S1 and S2 without rub, murmur or 
gallop
Gastrointestinal: normal bowel sounds, soft, non-tender, no organomegaly
Extremities: normal inspection
Neurologic/Psych: no motor/sensory deficits, alert, oriented x 3
Cranial Nerves: Cranial Nerves II-XII grossly intact
Skin: ecchymosis, eccymoses located across anterior abdomen
 
Results
Pertinent Lab Results:
 Laboratory Tests
 
 
 05/26 05/26
 
 1545 1236
 
Coagulation  
 
  PT (9.4 - 12.5 SEC) 29.7  H 
 
  INR (0.90 - 1.19) 2.70  H 
 
  APTT (25 - 37 SEC) 46  H 
 
Hematology  
 
  CBC w Diff  NO MAN DIFF REQ
 
  WBC (4.8 - 10.8 /CUMM)  3.5  L
 
  RBC (4.20 - 5.40 /CUMM)  3.04  L
 
  Hgb (12.0 - 16.0 G/DL)  10.5  L
 
  Hct (37 - 47 %)  31.2  L
 
  MCV (81.0 - 99.0 FL)  102.5  H
 
  MCH (27.0 - 31.0 PG)  34.6  H
 
  MCHC (33.0 - 37.0 G/DL)  33.8
 
  RDW (11.5 - 14.5 %)  15.8  H
 
  Plt Count (130 - 400 /CUMM)  94  L
 
  MPV (7.4 - 10.4 FL)  8.4
 
  Gran % (42.2 - 75.2 %)  45.6
 
  Lymphocytes % (20.5 - 51.1 %)  35.8
 
  Monocytes % (1.7 - 9.3 %)  9.3
 
  Eosinophils % (0 - 5 %)  8.5  H
 
  Basophils % (0.0 - 2.0 %)  0.8
 
  Absolute Granulocytes (1.4 - 6.5 /CUMM)  1.6
 
  Absolute Lymphocytes (1.2 - 3.4 /CUMM)  1.2
 
  Absolute Monocytes (0.10 - 0.60 /CUMM)  0.3
 
  Absolute Eosinophils (0.0 - 0.7 /CUMM)  0.3
 
  Absolute Basophils (0.0 - 0.2 /CUMM)  0
 
 
 
 
 05/26 05/26
 
 0622 0600
 
Coagulation  
 
  PT  Cancelled
 
  INR  Cancelled
 
Toxicology  
 
  Urine Opiates Screen (>2000 NG/ML) 138 
 
  Methadone Screen (>300 NG/ML) 71 
 
  Barbiturate Screen (>200 NG/ML) < 60 
 
  Ur Phencyclidine Scrn (>25 NG/ML) 7.50 
 
  Amphetamines Screen (>1000 NG/ML) < 100 
 
  U Benzodiazepines Scrn (>200 NG/ML) < 85 
 
  Urine Cocaine Screen (>300 NG/ML) < 50 
 
  Urine Cannabis Screen (>50 NG/ML) < 5.00 
 
Urines  
 
  Urine Color (YEL,AMB,STR) YEL 
 
  Urine Clarity (CLEAR) CLEAR 
 
  Urine pH (5.0 - 8.0) 6.0 
 
  Ur Specific Gravity (1.001 - 1.035) 1.015 
 
  Urine Protein (NEG,<30 MG/DL) NEG 
 
  Urine Ketones (NEG) NEG 
 
  Urine Nitrite (NEG) NEG 
 
  Urine Bilirubin (NEG) NEG 
 
  Urine Urobilinogen (0.1  -  1.0 EU/dl) 0.2 
 
  Ur Leukocyte Esterase (NEG) SMALL  H 
 
  Ur Microscopic SEDIMENT EXAMINED 
 
  Urine WBC (0 - 2 /HPF) 3-5  H 
 
  Ur Epithelial Cells (NONE,FEW) FEW 
 
  Urine Bacteria (NEG/NONE) FEW  H 
 
  Hyaline Casts (0/LPF) FEW  H 
 
  Urine Hemoglobin (NEG) NEG 
 
  Urine Glucose (N MG/DL) NEG 
 
 
 
 
 05/26 05/25
 
 0352 2154
 
Chemistry  
 
  Sodium (137 - 145 mmol/L) 140 142
 
  Potassium (3.5 - 5.1 mmol/L) 4.2 4.5
 
  Chloride (98 - 107 mmol/L) 103 99
 
  Carbon Dioxide (22 - 30 mmol/L) 26 23
 
  Anion Gap (5 - 16) 12 20  H
 
  BUN (7 - 17 mg/dL) 39  H 40  H
 
  Creatinine (0.5 - 1.0 mg/dL) 2.0  H 2.1  H
 
  Estimated GFR (>60 ml/min) 25  L 24  L
 
  BUN/Creatinine Ratio (7 - 25 %)  19.0
 
  Glucose (65 - 99 mg/dL) 100  H 140  H
 
  Calcium (8.4 - 10.2 mg/dL) 9.0 9.3
 
  Phosphorus (2.5 - 4.5 mg/dL) 5.3  H 
 
  Magnesium (1.6 - 2.3 mg/dL) 1.8 1.7
 
  Iron (37 - 170 ug/dL)  132
 
  TIBC (265 - 497 ug/dL)  337
 
  Ferritin (11.1 - 264 ng/mL)  189.0
 
  Total Bilirubin (0.2 - 1.3 mg/dL) 0.6 0.8
 
  AST (14 - 36 U/L) 21 33
 
  ALT (9 - 52 U/L) 25 23
 
  Alkaline Phosphatase (<127 U/L)  91
 
  Troponin I (< 0.11 ng/ml) < 0.01 < 0.01
 
  Total Protein (6.3 - 8.2 g/dL)  6.3
 
  Albumin (3.5 - 5.0 g/dL) 3.1  L 3.8
 
  Globulin (1.9 - 4.2 gm/dL)  2.5
 
  Albumin/Globulin Ratio (1.1 - 2.2 %)  1.5
 
  Vitamin B12 (239 - 931 pg/mL)  476
 
  Folate (2.76 - 20.0 ng/mL)  > 20.0  H
 
  TSH (0.270 - 4.200 uIU/mL)  9.550  H
 
  Free T4 (0.78 - 2.44 ng/dL)  0.92
 
Coagulation  
 
  PT (9.4 - 12.5 SEC) 63.0 *H 60.4 *H
 
  INR (0.90 - 1.19) 5.68 *H 5.45 *H
 
  APTT (25 - 37 SEC)  57  H
 
Hematology  
 
  CBC w Diff NO MAN DIFF REQ NO MAN DIFF REQ
 
  WBC (4.8 - 10.8 /CUMM) 4.7  L 5.1
 
  RBC (4.20 - 5.40 /CUMM) 3.02  L 3.35  L
 
  Hgb (12.0 - 16.0 G/DL) 10.5  L 11.6  L
 
  Hct (37 - 47 %) 30.7  L 34.1  L
 
  MCV (81.0 - 99.0 FL) 101.6  H 101.6  H
 
  MCH (27.0 - 31.0 PG) 34.8  H 34.7  H
 
  MCHC (33.0 - 37.0 G/DL) 34.2 34.2
 
  RDW (11.5 - 14.5 %) 15.6  H 15.7  H
 
  Plt Count (130 - 400 /CUMM) 106  L 129  L
 
  MPV (7.4 - 10.4 FL) 8.6 8.3
 
  Gran % (42.2 - 75.2 %) 41.1  L 52.0
 
  Lymphocytes % (20.5 - 51.1 %) 40.2 32.0
 
  Monocytes % (1.7 - 9.3 %) 9.7  H 8.7
 
  Eosinophils % (0 - 5 %) 8.3  H 6.5  H
 
  Basophils % (0.0 - 2.0 %) 0.7 0.8
 
  Absolute Granulocytes (1.4 - 6.5 /CUMM) 1.9 2.6
 
  Absolute Lymphocytes (1.2 - 3.4 /CUMM) 1.9 1.6
 
  Absolute Monocytes (0.10 - 0.60 /CUMM) 0.5 0.4
 
  Absolute Eosinophils (0.0 - 0.7 /CUMM) 0.4 0.3
 
  Absolute Basophils (0.0 - 0.2 /CUMM) 0 0
 
Toxicology  
 
  Serum Alcohol (<10 MG/DL)  18.0
 
 
 
 
 05/25
 
 UNK
 
Chemistry 
 
  Sodium Cancelled
 
  Potassium Cancelled
 
  Chloride Cancelled
 
  Carbon Dioxide Cancelled
 
  Anion Gap Cancelled
 
  BUN Cancelled
 
  Creatinine Cancelled
 
  Glucose Cancelled
 
  Calcium Cancelled
 
  Phosphorus Cancelled
 
  Magnesium Cancelled
 
  Total Bilirubin Cancelled
 
  AST Cancelled
 
  ALT Cancelled
 
  Albumin Cancelled
 
 
 
 
Assessment/Plan
Assessment/Recommendations:
ASSESSMENT:
1.  Coagulopathy -- correct vigorously with FFP
2.  Acute drop in H&H
3.  Chronic use of anticoagulants
4.  Use of NSAIDs
5.  Melena
6.  Acute Kidney Injury
 
RECOMMENDATIONS:
1.  Correct coagulopathy
2.  Nothing by mouth after midnight
3.  Serial H&H.  Keep Hgb between 7 and 8
4.  Risks and benefits of EGD were discussed with patient.
5.  If EGD is negative will consider inpatient colonoscopy.  However if source 
of bleeding is identified on EGD patient will still need colonoscopy given 
personal history of polyps.  Colonoscopy can be done in the outpatient setting 
in this case.
6.  Increase rate of IV Fluids and monitor BUN/Cr.  Also Monitor Strict I/Os and
give lasix as needed.
 
 
Consult Acknowledgment
- Thank you for your consult request.

## 2018-05-26 NOTE — CONS- CRCU
Danny CAMARILLO,[facility] 05/26/18 0846:
General Information and HPI
 
Consulting Request
Date of Consult: 05/26/18
Requested By:
Primary team
History of Present Illness:
53-year-old female with a past medical history of atrial fibrillation on 
coumadin, hypertension, hyperlipidemia, COPD, diabetes, CHF (EF 55%, 2015), IBS 
diarrhea predominant, with a recent history of taking meloxicam and then 
Naproxen presents to [facility] after being sent by coumading clinic for 
evaluation of "bruises".  She reports that she called her Coumadin clinic 
regarding her worsening skin bruises, and when she was asked whether she was 
having any blood in stool or black tarry stools, she reported she has had about 
3 day history of dark stools.  The Coumadin clinic thenn instructed her to go to
[facility] for further evaluation.  Patient is not exactly clear on the details 
of her bowel movement, was moderately anxious during the interview.  She states 
that mostly her stools have been dark for the past three days, but also reported
in some incidents, there was some "streaks of blood" on both the tissue paper 
and on stools.  She denied any pain during bowel movements or any unusual or 
strong odor in her stools.  Denies any constipation and actually reports chronic
diarrhea from IBS, no abdominal pain,hematemesis, or increased heartburn (she is
on a PPI at home).
 
Patient has been seen by the GI service by Dr Horne for heart burn and abdominal
pain,  EGD done in 2013 was only remarkable for chronic duodenitis and chronic 
antral gastritis.  The same year she also underwent a colonoscopy  which was 
positive for 2 benign tubular adenoma and no invasive carcinoma.
 
Allergies/Medications
Allergies:
Coded Allergies:
aspirin (NAUSEA 07/31/16)
colchicine (SEVERE DIARRHEA 05/25/18)
shellfish derived (RED FACED 07/31/16)
 
Home Med List:
Albuterol Sulfate (Proair Hfa) 90 MCG HFA.AER.AD   2 PUF INH Q4-6 PRN PRN 
BREATHING PROBLEMS  (Reported)
Cholecalciferol (Vitamin D3) 1,000 UNIT TABLET   1 TAB PO DAILY SUPPLEMENT  (
Reported)
Cyclobenzaprine HCl 10 MG TABLET   1 TAB PO BID MUSCLE SPASMS  (Reported)
Diltiazem HCl (Cardizem Cd) 180 MG CAP.ER.24H   1 CAP PO DAILY HEART  (Reported)
Furosemide 40 MG TABLET   1 TAB PO DAILY DIURETIC  (Reported)
Gabapentin 300 MG CAPSULE   1 CAP PO TID NERVE PAIN  (Reported)
Hyoscyamine (Levsin) 0.125 MG TABLET   1 TAB PO TID PRN ABD CRAMPS  (Reported)
Insulin Detemir (Levemir Flextouch) 100 UNIT/1 ML INSULN.PEN   30 UNITS SC QAM 
DIABETES  (Reported)
Latanoprost 2.5 ML DROPS   1 GTT OPH QPM BOTH EYES  (Reported)
Lisinopril 10 MG TABLET   1 TAB PO DAILY BP  (Reported)
Magnesium Oxide (Magnesium) 400 MG CAPSULE   1 CAP PO DAILY hypomagnesemia
Meloxicam 7.5 MG TABLET   1 TAB PO DAILY PAIN  (Reported)
Metoprolol Tartrate 100 MG TABLET   1 TAB PO BID HEART/BP  (Reported)
Mometasone Furoate (Nasonex) 17 GM SPRAY.PUMP   2 SPRAY NASB DAILY ALLERGIES  (
Reported)
Multivit-Min/Folic Acid/Biotin (Women Multivit W-Biotin Gummy) 200 MCG-300 MCG 
TAB.CHEW   1 TAB PO DAILY SUPPLEMENT  (Reported)
Omega-3 Fatty Acids/Fish Oil (Fish Oil 1,200 MG Softgel) 360 MG-1,200 MG CAPSULE
  1 CAP PO BID SUPPLEMENT  (Reported)
Pantoprazole Sodium 40 MG TABLET.DR   1 TAB PO DAILY GI  (Reported)
Potassium Chloride 10 MEQ TABLET.ER   1 TAB PO DAILY SUPPLEMENT  (Reported)
Rosuvastatin Calcium (Crestor) 10 MG TABLET   1 TAB PO DAILY CHOLESTEROL  (
Reported)
Tramadol HCl (Tramadol HCl ER) 200 MG TAB.ER.24H   1 TAB PO QAM PAIN  (Reported)
Tramadol HCl 50 MG TABLET   1 TAB PO BIDP PRN PAIN
Vitamin E Mixed (Vitamin E) 400 UNIT TABLET   1 TAB PO DAILY SUPPLEMENT  (
Reported)
Warfarin Sodium 2 MG TABLET   1 TAB PO MoWeFr BLOOD THINNER  (Reported)
Warfarin Sodium (Coumadin) 1 MG TABLET   1 TAB PO SuTuThSa BLOOD THINNER  (
Reported)
 
 
Review of Systems
 
Review of Systems
Constitutional:
Reports: see HPI. 
 
Past History
 
Travel History
Traveled to [place] past 21 day No
 
Medical History
Blood Transfusion Hx: No
Neurological: peripheral neuropathy
EENT: glaucoma, ABN AUDITORY PERCEPTION
Cardiovascular: AFIB, CHF, hypertension, hyperlipidemia
Respiratory: bronchitis, COPD
Gastrointestinal: GERD, irritable bowel syndrome
Hepatic: NONE
Renal: NONE
Musculoskeletal: chronic back pain, SHOULDER JOINT PAIN BILATERAL KNEE PAIN
Psychiatric: NONE
Endocrine: diabetes
Blood Disorders: NONE
Cancer(s): NONE
GYN/Reproductive: NONE
 
Surgical History
Surgical History: non-contributory
 
Family History
Relations & Conditions If Any:
FATHER (tb, CHF).
MOTHER (pancreatic ca).
 
 
Psychosocial History
Where Do You Live? Home
Services at Home: None
Smoking Status: Never Smoked
 
Exam & Diagnostic Data
Last 24 Hrs of Vital Signs/I&O
 Vital Signs
 
 
Date Time Temp Pulse Resp B/P B/P Pulse O2 O2 Flow FiO2
 
     Mean Ox Delivery Rate 
 
05/26 1400  78 26 116/72     
 
05/26 1200 97.9 72 24 110/74     
 
05/26 1108       Room Air Room Air 
 
05/26 0800      92 Room Air  
 
05/26 0800 98.2 69 18 110/70  92 Room Air  
 
05/26 0600 97.5 71 20 110/68     
 
05/26 0400 97.5 66 16 110/68     
 
05/26 0400      95 Room Air  
 
05/26 0138      96 Room Air  
 
05/26 0130 97.9 62 13 115/72     
 
05/26 0130 97.9 62 18 132/74  96 Room Air  
 
05/25 2209      96 Room Air  
 
05/25 2208  76  129/74     
 
05/25 2137 97.2 76 18 133/69  96 Room Air  
 
 
 Intake & Output
 
 
 05/26 1600 05/26 0800 05/26 0000
 
Intake Total 1030 595.6 0
 
Output Total 400 500 
 
Balance 630 95.6 0
 
    
 
Intake, Blood 340  
 
Product   
 
Intake,  595.6 0
 
Intake, Oral 0 0 
 
Number 0 0 
 
Bowel   
 
Movements   
 
Output, Urine 400 500 
 
Patient  288 lb 265 lb
 
Weight   
 
Weight  Bed scale Estimated
 
Measurement   
 
Method   
 
 
 
 
Physical Exam
General Appearance: alert, awake, patient crying and distressed due to family 
issues, obese
Respiratory: normal breath sounds
Cardiovascular: irregular irregular HR
Gastrointestinal: normal bowel sounds
Extremities: LE edema. 1+ pitting, 2+ radial pulses
Last 48 Hrs of Labs/Luke:
 Laboratory Tests
 
05/26/18 1545:
PT Pending, INR Pending, APTT Pending
 
05/26/18 1236:
CBC w Diff NO MAN DIFF REQ, RBC 3.04  L, .5  H, MCH 34.6  H, MCHC 33.8, 
RDW 15.8  H, MPV 8.4, Gran % 45.6, Lymphocytes % 35.8, Monocytes % 9.3, 
Eosinophils % 8.5  H, Basophils % 0.8, Absolute Granulocytes 1.6, Absolute 
Lymphocytes 1.2, Absolute Monocytes 0.3, Absolute Eosinophils 0.3, Absolute 
Basophils 0
 
05/26/18 0622:
Urine Opiates Screen 138, Methadone Screen 71, Barbiturate Screen < 60, Ur 
Phencyclidine Scrn 7.50, Amphetamines Screen < 100, U Benzodiazepines Scrn < 85,
Urine Cocaine Screen < 50, Urine Cannabis Screen < 5.00, Urine Color YEL, Urine 
Clarity CLEAR, Urine pH 6.0, Ur Specific Gravity 1.015, Urine Protein NEG, Urine
Ketones NEG, Urine Nitrite NEG, Urine Bilirubin NEG, Urine Urobilinogen 0.2, Ur 
Leukocyte Esterase SMALL  H, Ur Microscopic SEDIMENT EXAMINED, Urine WBC 3-5  H,
Ur Epithelial Cells FEW, Urine Bacteria FEW  H, Hyaline Casts FEW  H, Urine 
Hemoglobin NEG, Urine Glucose NEG
 
05/26/18 0600:
PT Cancelled, INR Cancelled
 
05/26/18 0352:
Anion Gap 12, Estimated GFR 25  L, Glucose 100  H, Calcium 9.0, Phosphorus 5.3  
H, Magnesium 1.8, Total Bilirubin 0.6, AST 21, ALT 25, Troponin I < 0.01, 
Albumin 3.1  L, PT 63.0 *H, INR 5.68 *H, CBC w Diff NO MAN DIFF REQ, RBC 3.02  L
, .6  H, MCH 34.8  H, MCHC 34.2, RDW 15.6  H, MPV 8.6, Gran % 41.1  L, 
Lymphocytes % 40.2, Monocytes % 9.7  H, Eosinophils % 8.3  H, Basophils % 0.7, 
Absolute Granulocytes 1.9, Absolute Lymphocytes 1.9, Absolute Monocytes 0.5, 
Absolute Eosinophils 0.4, Absolute Basophils 0
 
05/25/18 2154:
Anion Gap 20  H, Estimated GFR 24  L, BUN/Creatinine Ratio 19.0, Glucose 140  H,
Calcium 9.3, Magnesium 1.7, Iron 132, TIBC 337, Ferritin 189.0, Total Bilirubin 
0.8, AST 33, ALT 23, Alkaline Phosphatase 91, Troponin I < 0.01, Total Protein 
6.3, Albumin 3.8, Globulin 2.5, Albumin/Globulin Ratio 1.5, Vitamin B12 476, 
Folate > 20.0  H, TSH 9.550  H, Free T4 0.92, PT 60.4 *H, INR 5.45 *H, APTT 57  
H, CBC w Diff NO MAN DIFF REQ, RBC 3.35  L, .6  H, MCH 34.7  H, MCHC 34.2
, RDW 15.7  H, MPV 8.3, Gran % 52.0, Lymphocytes % 32.0, Monocytes % 8.7, 
Eosinophils % 6.5  H, Basophils % 0.8, Absolute Granulocytes 2.6, Absolute 
Lymphocytes 1.6, Absolute Monocytes 0.4, Absolute Eosinophils 0.3, Absolute 
Basophils 0, Serum Alcohol 18.0
 
05/25/18 1000:
Sodium Cancelled, Potassium Cancelled, Chloride Cancelled, Carbon Dioxide 
Cancelled, Anion Gap Cancelled, BUN Cancelled, Creatinine Cancelled, Glucose 
Cancelled, Calcium Cancelled, Phosphorus Cancelled, Magnesium Cancelled, Total 
Bilirubin Cancelled, AST Cancelled, ALT Cancelled, Albumin Cancelled
 
 
Assessment/Plan CRCU
Impression/Plan:
A:
 
67-year-old lady with a history of diarrhea predominant IBS, on coumadin for 
afib, with current use of NSAIDs presents for evaluation of bright red blood in 
the setting of supratherapeutic INR. 
 
P:
 
#GI bleed in the setting of NSAID use and elevated INR
History of dark stool in the context of NSAID use and a supratherapeutic INR is 
concerning for upper GI bleed from NSAID induced ulcer precipitated by the 
elevated INR. She has an elevated BUN. Elevated BUN gives further credence to a 
more likely upper GI Bleed. 
Received 2 units of fluids
 
Intial H/H 11.6/34.1
Initial inr 5.45
 
Current H/H 10.5/31.2
Current INR 2.7 s/p 10 vit k and 1 unit ffp
 
-will give 2 more unit ffp. f/u coags @ 7pm
-full liquid diet
-plan for EGD tomorrow
-cont IV protonix
-cont off fluids
-Keep Hgb between 7 and 8
   
#etoh abuse
Her daughter does report that she drinks about 4-5 glasses of wine/day.Clinical 
significance as per to large amounts of intermittent alcohol consumption 
associated with elevated INR. 
 
-cont ciwa protocol and ativan iv prn
 
#SHANNAN
Baseline Cr 1.0, Admission Cr 2.1
Has received 2L+ of fluids thus far
-cont to monitor
-consider restarting fluids after EGD
 
#Thrombocytopenia. 
Appears to be chronic. 
-cont to monitor
 
#History of chronic diseases: Atrial fibrillation, IBS, gout, hypertension, 
diabetes, COPD, CHF.
-cardiology consult regarding holding anticoagulation
-holding HTN meds setting of GI bleed
-cont home meds after EGD and stable
-cont cardizem
 
FULL LIQUID DIET
DVT prophylaxis: Alps
CODE STATUS: full code
 
Problem List:
 1. GI bleed
 
 2. SHANNAN (acute kidney injury)
 
 
Consult Acknowledgment
- Thank you for your consult request.
 
 
Ashly CAMARILLO,[facility] 05/26/18 1022:
Assessment/Plan CRCU
Other Findings/Comments:
Seen and examined independently
66 yo F with h/o Afib on coumadin, HTN, T2DM, COPD, CHF, CKD stage 3, 
diverticulosis, chronic diarrhea, who was sent in by her coumadin clinic nurse 
as patient had noticed multiple bruises over her abdomen and reports 3-day h/o 
dark stools with occasional blood in it. Patient had not checked her INR for a 
few weeks now. 
Pt has had previoius barretts, and gastritis
Has had history of ETOH daily
Has been using NSAID daily
Previous polyps and hemorrhoids
 
Exam: AAO, anxious lady, stuttering speech, dry mouth, Chest b/l clear, Heart 
S1S2 irregular, 
Abd multiple ecchymotic patches noted. LE:b/l pedal edema+, peripheral pulses 
difficult to palpate. 
Right foot 5th toe is edematous but no warmth, erythemaI or tenderness. 
Rectal exam done in ER: dark stool, heme positive.
 
ISSUES
* MElena with ugi bleed in a lady with suprtherapeutic INR on coumadin, ETOH use
, NSAID use with prior PUD, Barretts
* PAFIB 
* INitial hypotension now resolved
* SHANNAN with ckd
* ETOH abuse watch for DT
* Thrombocytopenia with chronic etoh abuse will work up
 
REC
* Keep hgb more than 7-8
* cont all meds
* R/o MI
* Back off fluids
* IV PPI
* GI to see
* FFP
* VIt k has already been given
Watch for DT
tts 34
 
 
Consult Acknowledgment
- Thank you for your consult request.

## 2018-05-27 VITALS — SYSTOLIC BLOOD PRESSURE: 138 MMHG | DIASTOLIC BLOOD PRESSURE: 73 MMHG

## 2018-05-27 VITALS — SYSTOLIC BLOOD PRESSURE: 100 MMHG | DIASTOLIC BLOOD PRESSURE: 42 MMHG

## 2018-05-27 VITALS — SYSTOLIC BLOOD PRESSURE: 108 MMHG | DIASTOLIC BLOOD PRESSURE: 74 MMHG

## 2018-05-27 VITALS — DIASTOLIC BLOOD PRESSURE: 78 MMHG | SYSTOLIC BLOOD PRESSURE: 138 MMHG

## 2018-05-27 VITALS — DIASTOLIC BLOOD PRESSURE: 98 MMHG | SYSTOLIC BLOOD PRESSURE: 131 MMHG

## 2018-05-27 VITALS — DIASTOLIC BLOOD PRESSURE: 74 MMHG | SYSTOLIC BLOOD PRESSURE: 112 MMHG

## 2018-05-27 VITALS — DIASTOLIC BLOOD PRESSURE: 85 MMHG | SYSTOLIC BLOOD PRESSURE: 161 MMHG

## 2018-05-27 VITALS — SYSTOLIC BLOOD PRESSURE: 128 MMHG | DIASTOLIC BLOOD PRESSURE: 69 MMHG

## 2018-05-27 VITALS — DIASTOLIC BLOOD PRESSURE: 80 MMHG | SYSTOLIC BLOOD PRESSURE: 132 MMHG

## 2018-05-27 LAB
ABSOLUTE GRANULOCYTE CT: 2 /CUMM (ref 1.4–6.5)
APTT BLD: 35 SEC (ref 25–37)
BASOPHILS # BLD: 0 /CUMM (ref 0–0.2)
BASOPHILS NFR BLD: 0.3 % (ref 0–2)
EOSINOPHIL # BLD: 0.3 /CUMM (ref 0–0.7)
EOSINOPHIL NFR BLD: 7 % (ref 0–5)
ERYTHROCYTE [DISTWIDTH] IN BLOOD BY AUTOMATED COUNT: 15.7 % (ref 11.5–14.5)
GRANULOCYTES NFR BLD: 54 % (ref 42.2–75.2)
HCT VFR BLD CALC: 30.4 % (ref 37–47)
LYMPHOCYTES # BLD: 1.1 /CUMM (ref 1.2–3.4)
MCH RBC QN AUTO: 34.4 PG (ref 27–31)
MCHC RBC AUTO-ENTMCNC: 33.4 G/DL (ref 33–37)
MCV RBC AUTO: 103.1 FL (ref 81–99)
MONOCYTES # BLD: 0.4 /CUMM (ref 0.1–0.6)
PLATELET # BLD: 84 /CUMM (ref 130–400)
PMV BLD AUTO: 8.2 FL (ref 7.4–10.4)
PROTHROMBIN TIME: 18.6 SEC (ref 9.4–12.5)
RED BLOOD CELL CT: 2.95 /CUMM (ref 4.2–5.4)
WBC # BLD AUTO: 3.7 /CUMM (ref 4.8–10.8)

## 2018-05-27 PROCEDURE — 0DB78ZX EXCISION OF STOMACH, PYLORUS, VIA NATURAL OR ARTIFICIAL OPENING ENDOSCOPIC, DIAGNOSTIC: ICD-10-PCS

## 2018-05-27 PROCEDURE — 0DB68ZX EXCISION OF STOMACH, VIA NATURAL OR ARTIFICIAL OPENING ENDOSCOPIC, DIAGNOSTIC: ICD-10-PCS

## 2018-05-27 NOTE — CONS- CARDIOLOGY
General Information and HPI
 
Consulting Request
Date of Consult: 05/27/18
Requested By:
Mendoza CAMARILLO,Aaron
 
History of Present Illness:
Ms. Choudhury is a 67 year old female with history of hypertension, dyslipidemia,
diabetes and COPD. She also carries a history of chronic atrial fibrillation on 
chronic anticoagulation. Finally, this patient has known diverticular disease. 
This patient has been taking NSAIDs such as Meloxicam and Naproxen and has noted
copious bruises that are not in the location that she injects her insulin. She 
has also noted dark stools with possible streaks of blood for about three days. 
She denies any abdominal discomfort and otherwise denies chest pain, pressure, 
tightness, lightehadedness or palpitations. She does have shortness of breath.
 
Upon initial presentation this patient had renal insufficiency with a creatinine
of 2.1 and INR of over 5.
 
 
Allergies/Medications
Allergies:
Coded Allergies:
aspirin (NAUSEA 07/31/16)
colchicine (SEVERE DIARRHEA 05/25/18)
shellfish derived (RED FACED 07/31/16)
 
Home Med List:
Albuterol Sulfate (Proair Hfa) 90 MCG HFA.AER.AD   2 PUF INH Q4-6 PRN PRN 
BREATHING PROBLEMS  (Reported)
Cholecalciferol (Vitamin D3) 1,000 UNIT TABLET   1 TAB PO DAILY SUPPLEMENT  (
Reported)
Cyclobenzaprine HCl 10 MG TABLET   1 TAB PO BID MUSCLE SPASMS  (Reported)
Diltiazem HCl (Cardizem Cd) 180 MG CAP.ER.24H   1 CAP PO DAILY HEART  (Reported)
Furosemide 40 MG TABLET   1 TAB PO DAILY DIURETIC  (Reported)
Gabapentin 300 MG CAPSULE   1 CAP PO TID NERVE PAIN  (Reported)
Hyoscyamine (Levsin) 0.125 MG TABLET   1 TAB PO TID PRN ABD CRAMPS  (Reported)
Insulin Detemir (Levemir Flextouch) 100 UNIT/1 ML INSULN.PEN   30 UNITS SC QAM 
DIABETES  (Reported)
Latanoprost 2.5 ML DROPS   1 GTT OPH QPM BOTH EYES  (Reported)
Lisinopril 10 MG TABLET   1 TAB PO DAILY BP  (Reported)
Magnesium Oxide (Magnesium) 400 MG CAPSULE   1 CAP PO DAILY hypomagnesemia
Meloxicam 7.5 MG TABLET   1 TAB PO DAILY PAIN  (Reported)
Metoprolol Tartrate 100 MG TABLET   1 TAB PO BID HEART/BP  (Reported)
Mometasone Furoate (Nasonex) 17 GM SPRAY.PUMP   2 SPRAY NASB DAILY ALLERGIES  (
Reported)
Multivit-Min/Folic Acid/Biotin (Women Multivit W-Biotin Gummy) 200 MCG-300 MCG 
TAB.CHEW   1 TAB PO DAILY SUPPLEMENT  (Reported)
Omega-3 Fatty Acids/Fish Oil (Fish Oil 1,200 MG Softgel) 360 MG-1,200 MG CAPSULE
  1 CAP PO BID SUPPLEMENT  (Reported)
Pantoprazole Sodium 40 MG TABLET.DR   1 TAB PO DAILY GI  (Reported)
Potassium Chloride 10 MEQ TABLET.ER   1 TAB PO DAILY SUPPLEMENT  (Reported)
Rosuvastatin Calcium (Crestor) 10 MG TABLET   1 TAB PO DAILY CHOLESTEROL  (
Reported)
Tramadol HCl (Tramadol HCl ER) 200 MG TAB.ER.24H   1 TAB PO QAM PAIN  (Reported)
Tramadol HCl 50 MG TABLET   1 TAB PO BIDP PRN PAIN
Vitamin E Mixed (Vitamin E) 400 UNIT TABLET   1 TAB PO DAILY SUPPLEMENT  (
Reported)
Warfarin Sodium 2 MG TABLET   1 TAB PO MoWeFr BLOOD THINNER  (Reported)
Warfarin Sodium (Coumadin) 1 MG TABLET   1 TAB PO SuTuThSa BLOOD THINNER  (
Reported)
 
 
Review of Systems
Review of Systems:
A review of system is remarkable for foot discomfort.
 
Past History
 
Travel History
Traveled to [place] past 21 day No
 
Medical History
Blood Transfusion Hx: No
Neurological: peripheral neuropathy
EENT: glaucoma, ABN AUDITORY PERCEPTION
Cardiovascular: AFIB, CHF, hypertension, hyperlipidemia
Respiratory: bronchitis, COPD
Gastrointestinal: GERD, irritable bowel syndrome
Hepatic: NONE
Renal: NONE
Musculoskeletal: chronic back pain, SHOULDER JOINT PAIN BILATERAL KNEE PAIN
Psychiatric: NONE
Endocrine: diabetes
Blood Disorders: NONE
Cancer(s): NONE
GYN/Reproductive: NONE
 
Surgical History
Surgical History: non-contributory
 
Family History
Relations & Conditions If Any:
FATHER (tb, CHF).
MOTHER (pancreatic ca).
 
 
Psychosocial History
Where Do You Live? Home
Services at Home: None
Smoking Status: Never Smoked
 
Exam & Diagnostic Data
Vital Signs and I&O
Vital Signs
 
 
Date Time Temp Pulse Resp B/P B/P Pulse O2 O2 Flow FiO2
 
     Mean Ox Delivery Rate 
 
05/27 1600      95 Room Air  
 
05/27 1600 98.0 68 20 100/42  95 Room Air  
 
05/27 1200       Room Air  
 
05/27 1200  80 21 128/69     
 
05/27 1000  90 19 138/73     
 
05/27 0800       Room Air  
 
05/27 0800 97.3 90 22 132/80     
 
05/27 0800 97.3 90 22 132/80  93 Room Air  
 
05/27 0623 98.0 92 15 161/85     
 
05/27 0400 98.0 81 19 131/98     
 
05/27 0400      95 Room Air  
 
05/27 0000 97.3 77 18 138/78     
 
05/27 0000      95 Room Air  
 
05/26 2336 97.3 77 19 138/78  95 Room Air  
 
 
 Intake & Output
 
 
 05/27 1600 05/27 0800 05/27 0000 05/26 1600 05/26 0800 05/26 0000
 
Intake Total 276 143.2 1460.9 1030 595.6 0
 
Output Total  400 500 
 
Balance -424 -756.8 60.9 630 95.6 0
 
       
 
Intake, Blood   330 340  
 
Product      
 
Intake,  143.2 170.9 690 595.6 0
 
Intake, Oral 100 0 960 0 0 
 
Number  0 0 0 0 
 
Bowel      
 
Movements      
 
Output, Urine  400 500 
 
Patient 281 lb    288 lb 265 lb
 
Weight      
 
Weight Bed scale    Bed scale Estimated
 
Measurement      
 
Method      
 
 
 
Physical Exam:
General: WD/obses female in NAD; alert and oriented x 3
Skin: diffuse purpura
HEENT: NC/AT, PERRL, EOMI
Neck: no JVD, no carotid bruit
Heart: irregularly irregular
Lungs: clear bilaterally
Abdomen: soft, NT, +ve bowel sounds
Extremities: no edema
 
Assessment/Plan
Assessment/Plan
* This patient has chronic atrial fibrillation and ideally should be on chronic 
anticoagulation for stroke prophylaxis. She, for the most part tolerates this 
but, recently, has had a supratherapeutic INR combined with NSAID use. As such, 
she has GI bleeding and diffuse purpura. The patient was taking NSAIDS for foot 
pain and possible gout. I would treat this with allopurinol and colchicine. 
* No active bleeding noted on endoscopy. Restart chronic anticoagulation with 
Eliquis 2.5mg BID if okay with GI. Would not employ coumadin since control was 
poor. No NSAIDS.
* Good rate control on current medications.
 
 
Consult Acknowledgment
- Thank you for your consult request.

## 2018-05-27 NOTE — PATIENT DISCHARGE INSTRUCTIONS
Discharge Instructions
 
General Discharge Information
You were seen/treated for:
ACUTE GI BLOOD LOSS
Special Instructions:
Please follow up with your PCP in 1-2 weeks
Please follow up with Dr. Landin in 1 week.
Please follow up with your cardiologist in 1 week.
Please avoid NSAIDs
 
**Please start your eliquis tonight.
 
 
 
Diet
Continue normal diet: Yes
 
Acute Coronary Syndrome
 
Inclusion Criteria
At DC or during hospital stay patient has or had the following:
ACS DIAGNOSIS No
 
Discharge Core Measures
Meds if any: Prescribed or Continued at Discharge
Meds if any: NOT Prescribed or Continued at Discharge
 
Congestive Heart Failure
 
Inclusion Criteria
At DC or during hospital stay patient has or had the following:
CHF DIAGNOSIS No
 
Discharge Core Measures
Meds if any: Prescribed or Continued at Discharge
Meds if any: NOT Prescribed or Continued at Discharge
 
Cerebrovascular accident
 
Inclusion Criteria
At DC or during hospital stay patient has or had the following:
CVA/TIA Diagnosis No
 
Discharge Core Measures
Meds if any: Prescribed or Continued at Discharge
Meds if any: NOT Prescribed or Continued at Discharge
 
Venous thromboembolism
 
Inclusion Criteria
VTE Diagnosis No
VTE Type NONE
VTE Confirmed by (Test) NONE
 
Discharge Core Measures
- Per Current guidelines, there needs to be overlap
- treatment for the first 5 days of Warfarin therapy.
- If discharged on Warfarin prior to 5 days of
- overlap therapy, the patient will need to be
- assessed for post discharge needs including
- *Post discharge parental anticoagulation
- *Warfarin and/or parental anticoagulation education
- *Follow up date to check INR post discharge
At least 5 days overlap therapy as Inpatient No
Meds if any: Prescribed or Continued at Discharge
Note: Overlap Therapy is Warfarin and Anticoagulant
Meds if any: NOT Prescribed or Continued at Discharge

## 2018-05-27 NOTE — PN- CRCU
Subjective
HPI/Critical Care Issues:
Appears relatively stable
Has some headache in the left side
Slightly anxious
No further bowel movements
Complained of some ear pain on the left side this was inspected and there was no
discharge from the left side
 
Significant data
Creatinine is improved to 1.2 other blood work as noted white count 3.7 
hemoglobin stable at 10.1 previous baseline hemoglobin was 12.6 platelets have 
reduced to 84
Her INR is now 1.7
She is MRSA positive by nasal swab
Chest x-ray done yesterday showed no significant pneumonia
 
 
Objective
Current Medications:
 Current Medications
 
 
  Sig/Andrea Start time  Last
 
Medication Dose Route Stop Time Status Admin
 
Acetaminophen 1,000 MG ONCE ONE 05/27 1045 DC 05/27
 
N/A 1 UNIT IV 05/27 1059  1049
 
Acetaminophen 500 MG ONCE ONE 05/26 1245 DC 05/26
 
  IV 05/26 1246  1257
 
Albuterol Sulfate 2 PUF Q4P PRN 05/26 1115 AC 
 
  INH   
 
Diltiazem HCl 180 MG DAILY 05/26 1600 AC 05/27
 
  PO   0840
 
Gabapentin 300 MG TID 05/26 0900 AC 05/27
 
  PO   0840
 
Insulin Human Regular 0 Q6 05/25 2359 AC 05/26
 
  SC   1805
 
Lorazepam 0 Q1P PRN 05/26 0230 AC 05/27
 
  IV   0727
 
Pantoprazole Sodium 40 MG Q5H 05/25 2300 AC 05/27
 
Sodium Chloride 100 ML IV   1049
 
Tramadol HCl 50 MG BID PRN 05/26 2100 AC 05/27
 
  PO   0608
 
 
 
 
Vital Signs & I&O
Last 24 Hrs of Vitals and I&O:
 Vital Signs
 
 
Date Time Temp Pulse Resp B/P B/P Pulse O2 O2 Flow FiO2
 
     Mean Ox Delivery Rate 
 
05/27 1000  90 19 138/73     
 
05/27 0800       Room Air  
 
05/27 0800 97.3 90 22 132/80     
 
05/27 0800 97.3 90 22 132/80  93 Room Air  
 
05/27 0623 98.0 92 15 161/85     
 
05/27 0400 98.0 81 19 131/98     
 
05/27 0400      95 Room Air  
 
05/27 0000 97.3 77 18 138/78     
 
05/27 0000      95 Room Air  
 
05/26 2336 97.3 77 19 138/78  95 Room Air  
 
05/26 2000 97.9 84 14 118/74     
 
05/26 2000      94 Room Air  
 
05/26 1800  77 20 131/82     
 
05/26 1600 97.5 74 20 110/70     
 
05/26 1600 97.5 74 20 110/70  96 Room Air  
 
05/26 1400  78 26 116/72     
 
05/26 1200 97.9 72 24 110/74     
 
 
 Intake & Output
 
 
 05/27 1600 05/27 0800 05/27 0000
 
Intake Total  143.2 1460.9
 
Output Total  900 1400
 
Balance  -756.8 60.9
 
    
 
Intake, Blood   330
 
Product   
 
Intake, IV  143.2 170.9
 
Intake, Oral  0 960
 
Number  0 0
 
Bowel   
 
Movements   
 
Output, Urine  900 1400
 
Patient 281 lb  
 
Weight   
 
Weight Bed scale  
 
Measurement   
 
Method   
 
 
 
 
Impression/Plan
 
Impression/Plan
Impression/Plan:
General Appearance: alert, awake, comfortable
Head: atraumatic, normal appearance
Eyes:
Bilateral: normal appearance. 
Ears, Nose, Throat: hearing grossly normal
Neck: supple, full range of motion
Respiratory: normal breath sounds, lungs clear
Cardiovascular: regular rate/rhythm, Normal S1 and S2 without rub, murmur or 
gallop
Gastrointestinal: normal bowel sounds, soft, non-tender, no organomegaly
Extremities: normal inspection
Neurologic/Psych: no motor/sensory deficits, alert, oriented x 3
Cranial Nerves: Cranial Nerves II-XII grossly intact
Skin: ecchymosis, eccymoses located across anterior abdomen
 
IMPRESSION
This is a lady with paroxysmal atrial fibrillation on warfarin, significant 
alcohol use, recent supratherapeutic INR, NSAID use in the recent past came in 
with multiple bruises in the abdominal wall and was found to have dark in 
positive stool.  Her issues include
Acute blood loss anemia with melena related to upper GI bleed.  This may be 
related to coagulopathy and recent NSAID use compounded by alcohol.  Patient 
seems to be better with no evidence of active bleeding.
 
Coagulopathy due to anticoagulation use with warfarin
Alcohol use
Chronic kidney disease with acute renal insufficiency slowly improving
Initial hypotension now resolved
Thrombocytopenia with chronic alcohol use which needs to be worked up
Paroxysmal atrial fibrillation
Mild headache today which appears to be chronic
Morbid obesity
 
 
PLAN
Continue her current medication
Change her to IV Protonix twice a day
Keep her hemoglobin around 7
Patient appears euvolemic.  We'll continue maintenance fluids with D5 half-
normal saline at 75 mL per hour
Continue rate control medication
Hold anticoagulation
INR is 1.7 and if there is no further signs of bleeding we will hold off on 
using ffp
We'll continue to watch her renal function
tts 38 mins

## 2018-05-27 NOTE — PN- CRCU
Subjective
HPI/Critical Care Issues:
#1 GI Bleed in setting on BSAID s/p EGD today found to have erosions.
#2 ETOH abuse
#3 SHANNAN
#4 thrombocytopenia
 
 
Objective
Current Medications:
 Current Medications
 
 
  Sig/Andrea Start time  Last
 
Medication Dose Route Stop Time Status Admin
 
Acetaminophen 1,000 MG ONCE ONE 05/27 1045 DC 05/27
 
N/A 1 UNIT IV 05/27 1059  1049
 
Albuterol Sulfate 2 PUF Q4P PRN 05/26 1115 AC 
 
  INH   
 
Atorvastatin Calcium 10 MG DAILY 05/28 0900 AC 
 
  PO   
 
Cholecalciferol 1,000 IU DAILY 05/27 1859 AC 
 
  PO   
 
Diltiazem HCl 180 MG DAILY 05/26 1600 AC 05/27
 
  PO   0840
 
Furosemide 40 MG DAILY 05/28 0900 AC 
 
  PO   
 
Gabapentin 300 MG TID 05/26 0900 AC 05/27
 
  PO   1632
 
Insulin Aspart 0 TIDAC 05/28 0800 AC 05/27
 
  SC   1835
 
Insulin Human Regular 0 Q6 05/25 2359 DC 05/26
 
  SC   1805
 
Lisinopril 10 MG DAILY 05/28 0900 AC 
 
  PO   
 
Lorazepam 0 Q1P PRN 05/26 0230 AC 05/27
 
  IV   0727
 
Magnesium Oxide 400 MG DAILY 05/28 0900 AC 
 
  PO   
 
Metoprolol Tartrate 100 MG BID 05/27 2100 AC 
 
  PO   
 
Pantoprazole Sodium 40 MG BID 05/27 2100 AC 
 
  IV   
 
Pantoprazole Sodium 40 MG Q5H 05/25 2300 DC 05/27
 
Sodium Chloride 100 ML IV   1049
 
Potassium Chloride 10 MEQ DAILY 05/28 0900 AC 
 
  PO   
 
Tramadol HCl 50 MG BID PRN 05/26 2100 AC 05/27
 
  PO   1631
 
 
 
 
Vital Signs & I&O
Last 24 Hrs of Vitals and I&O:
 Vital Signs
 
 
Date Time Temp Pulse Resp B/P B/P Pulse O2 O2 Flow FiO2
 
     Mean Ox Delivery Rate 
 
05/27 1600      95 Room Air  
 
05/27 1600 98.0 68 20 100/42  95 Room Air  
 
05/27 1200       Room Air  
 
05/27 1200  80 21 128/69     
 
05/27 1000  90 19 138/73     
 
05/27 0800       Room Air  
 
05/27 0800 97.3 90 22 132/80     
 
05/27 0800 97.3 90 22 132/80  93 Room Air  
 
05/27 0623 98.0 92 15 161/85     
 
05/27 0400 98.0 81 19 131/98     
 
05/27 0400      95 Room Air  
 
05/27 0000 97.3 77 18 138/78     
 
05/27 0000      95 Room Air  
 
05/26 2336 97.3 77 19 138/78  95 Room Air  
 
05/26 2000 97.9 84 14 118/74     
 
05/26 2000      94 Room Air  
 
 
 Intake & Output
 
 
 05/27 1600 05/27 0800 05/27 0000
 
Intake Total 276 143.2 1460.9
 
Output Total 
 
Balance -424 -756.8 60.9
 
    
 
Intake, Blood   330
 
Product   
 
Intake,  143.2 170.9
 
Intake, Oral 100 0 960
 
Number  0 0
 
Bowel   
 
Movements   
 
Output, Urine 
 
Patient 127.459 kg  
 
Weight   
 
Weight Bed scale  
 
Measurement   
 
Method   
 
 
 
 
Exam
General Appearance: well developed/nourished, no apparent distress, alert, awake
Head: atraumatic, normal appearance
Neck: normal inspection, supple
Respiratory: normal breath sounds, chest non-tender, no respiratory distress
Cardiovascular: regular rate/rhythm
Abdomen: normal bowel sounds, soft, non-tender
Back: normal inspection, normal range of motion
 
Results
Last 24 Hrs of Lab Results:
 Laboratory Tests
 
05/27/18 0300:
Anion Gap 10, Estimated GFR 45  L, Glucose 86, Calcium 9.1, Phosphorus 4.4, 
Magnesium 1.6, Total Bilirubin 0.7, AST 24, ALT 33, Albumin 3.3  L, PT 18.6  H, 
INR 1.70  H, APTT 35, CBC w Diff NO MAN DIFF REQ, RBC 2.95  L, .1  H, MCH
34.4  H, MCHC 33.4, RDW 15.7  H, MPV 8.2, Gran % 54.0, Lymphocytes % 28.9, 
Monocytes % 9.8  H, Eosinophils % 7.0  H, Basophils % 0.3, Absolute Granulocytes
2.0, Absolute Lymphocytes 1.1  L, Absolute Monocytes 0.4, Absolute Eosinophils 
0.3, Absolute Basophils 0
 
 
Diagnostic Data
CXR Findings:
SERVICE DATE: 05/25/
EXAM TYPE: RAD - XRY-PORTABLE CHEST XRAY
 
EXAMINATION:
XR PORTABLE CHEST
 
CLINICAL INFORMATION:
Atrial fibrillation. Dyspnea
 
COMPARISON:
01/18/17
 
TECHNIQUE:
Portable frontal view of the chest was obtained.
 
FINDINGS:
Extreme upper chest excluded.
 
There may be some tortuosity of the aorta. The cardiac size is unchanged. The
left hilum is obscured. No change in the right hilum. Some increased density
in the right base medially is unchanged. The lower lung zones are difficult
to assess due to habitus and technique. No acute consolidation.
 
No pneumothorax.
 
IMPRESSION:
Limited assessment of the lower lungs. No definite acute pneumonia or edema.
 
Impression/Plan
 
Impression/Plan
Impression/Plan:
This is 53-year-old female with a past medical history of atrial fibrillation on
coumadin, hypertension, hyperlipidemia, COPD, diabetes, CHF (EF 55%, 2015), IBS 
diarrhea predominant, with a recent history of taking meloxicam and then 
Naproxen presents to [place] [facility] after being sent by coumadin clinic for 
evaluation of "bruises".  She reports that she called her Coumadin clinic 
regarding her worsening skin bruises, and when she was asked whether she was 
having any blood in stool or black tarry stools, she reported she has had about 
3 day history of dark stools.  The Coumadin clinic then instructed her to go to 
[place] [facility] for further evaluation.  Patient is not exactly clear on the details 
of her bowel movement, was moderately anxious during the interview.  She states 
that mostly her stools have been dark for the past three days, but also reported
in some incidents, there was some "streaks of blood" on both the tissue paper 
and on stools. 
 
#GI bleed in the setting of NSAID use and elevated INR
* History of dark stool in the context of NSAID use 
* supratherapeutic INR is concerning for upper GI bleed from NSAID induced ulcer
precipitated by the elevated INR. 
* Initial H/H 11.6/34.1
* H\H today - 10.1/30.4
* Initial inr 5.45
* INR today 1.70 s/p vit k and FFP ( total 3 )
* s/p EGd with biopsy today 5/27/2018 with findings significant for diffuse 
gastric mucosal atrophy with erosion and 2  linear clean-based gastric ulcers
* Will d/c protonix drip, change to po protonix
* will advance diet
* ct to monitor
* check CBC tmrw
* if stable, anticipated d/c tmrw
* DG to Gen Med 
* Will need GI follow up for review of biopsy report.
 
 
   
#etoh abuse
* Her daughter does report that she drinks about 4-5 glasses of wine/day.
* Clinical significance as per to large amounts of intermittent alcohol 
consumption associated with elevated INR. 
* cont ciwa protocol and ativan iv prn
 
#SHANNAN
* prerenal due to volume loss.
* Baseline Cr 1.0, Admission Cr 2.1, todays creatinine - 1.2, improved with 
hydration, 
 
 
#Thrombocytopenia. 
* Appears to be chronic. 
* cont to monitor
 
#History of chronic diseases: 
* Atrial fibrillation, IBS, gout, hypertension, diabetes, COPD, CHF.
* We will continue to hold the anticoagulation for now.
* Review again 5/28/2018
* Restarted all home medications.
 
REGULAR DIET
DVT prophylaxis: Alps
CODE STATUS: full code
 
Code Status: Full Code
Problem List:
 1. SHANNAN (acute kidney injury)
 
 2. Diabetes mellitus
 
 3. Elevated serum creatinine
 
 4. GI bleed
 
 5. Gout

## 2018-05-27 NOTE — PROC NOTE ENDOSCOPY
Endoscopy Procedure
Medical History: unchanged
Mental Status: alert/oriented
Heart/Lung Eval Prior to Sedation: within normal limits
Candidate for Sedation? Yes
Procedure Date: 05/27/18
Procedure Type: EGD w/biopsy
:
MD Nuñez Deborah E.
ASA Classification: III
Indications:
1.  Melena
2.  Acute Blood Loss Anemia
3.  Chronic Use NSAIDs
 
Instrument: diagnostic gastroscope
Meds Received: MAC
Patient's Tolerance: good
Complications: none
Extent Reached: second part of duodenum
Procedure:
Note: Informed consent was obtained prior to procedure.  Risks and benefits of 
procedure were discussed with patient.  Potential complications discussed 
included perforation, bleeding, abdominal pain, and adverse reaction to 
medications.  It was explained that iany or all of these complications could 
result in the need for extended hospitalization, emergency surgery, transfusion 
of packed red blood cells (with the risk of HIV or hepatitis virus), intubation 
with mechanical ventilation, and possible need for antibiotics.  It was further 
explained that an existing tumor polyp or mucosal abnormality might not be 
identified at the time of the procedure thus resulting in a missed opportunity 
for early diagnosis and treatment of a gastrointestinal malignancy or disease 
with possible interval development of a gastrointestinal cancer or other disease
with possible worsening of clinical condition in the interval between 
endoscopies.  It was also discussed that complications are not limited to those 
listed above.  Possible alternatives to endoscopic treatment or evaluation were 
discussed.  All questions were answered.
 
Continuous EKG and blood pressure monitors were attached.  Supplemental oxygen 
was provided with O2 Sat monitoring.  
 
Patient was placed in the left lateral decubitus position.  A surgical timeout 
was performed.  All persons in the room were identified.  All concerns were 
expressed and answered.
 
A bite block was placed in the mouth and sedation was administered by anesthesia
and titrated to comfort prior to starting the procedure.  The Olympus upper 
endoscope was advanced under direct vision to the level of the third portion of 
the duodenum.
 
Esophagus: The esophagus had a normal mucosal vascular pattern throughout its 
entirety.  The GE junction was identified and was normal.  The Z line was 
located at 40 cm from the incisors and was nondisplaced
 
Stomach: The stomach had diffuse mucosal atrophy throughout its entirety.  
Retroflexed view of the cardiofundic region revealed a normal mucosal and 
vascular pattern.  There were normal rugae and normal distensibility.  The 
pylorus was patent and easily intubated.  Within the antrum there were scattered
erosions with no stigmata of bleeding.  There were 2 linear ulcers above the 
pylorus with wife-Lincoln exudate and no stigmata of bleeding.  Biopsies were 
obtained from the antrum, and gastric body to rule out H. Pylori.
 
Duodenum: The duodenum was fully examined from bulb down to the third portion.  
There was a normal mucosal vascular pattern throughout.
 
With the endoscope in the forward-viewing position, it was slowly withdrawn and 
all areas were re-inspected and findings are as described previously.  Patient 
tolerated the procedure well.
 
EBL:  Minimal
 
Specimens Removed:  antrum, and gastric body to rule out H. Pylori.
 
Findings:
1.  Diffuse gastric mucosal atrophy with erosion
2.  2 linear clean-based gastric ulcers
 
Impression:
1.  Diffuse gastric mucosal atrophy with erosion
2.  2 linear clean-based gastric ulcers
 
Recommendations:
1.  Change Protonix to 40 mg by mouth every morning
2.  Patient to be advised to avoid NSAIDs
3.  Advance diet
4.  Patient stable for discharge to home if diet tolerated
5.  Patient follow-up with Dr. Nuñez for biopsy review and further therapy is
needed.

## 2018-05-28 VITALS — DIASTOLIC BLOOD PRESSURE: 80 MMHG | SYSTOLIC BLOOD PRESSURE: 130 MMHG

## 2018-05-28 VITALS — DIASTOLIC BLOOD PRESSURE: 72 MMHG | SYSTOLIC BLOOD PRESSURE: 108 MMHG

## 2018-05-28 VITALS — SYSTOLIC BLOOD PRESSURE: 138 MMHG | DIASTOLIC BLOOD PRESSURE: 80 MMHG

## 2018-05-28 LAB
ABSOLUTE GRANULOCYTE CT: 4.4 /CUMM (ref 1.4–6.5)
APTT BLD: 21 SEC (ref 25–37)
BASOPHILS # BLD: 0 /CUMM (ref 0–0.2)
BASOPHILS NFR BLD: 0.6 % (ref 0–2)
EOSINOPHIL # BLD: 0.2 /CUMM (ref 0–0.7)
EOSINOPHIL NFR BLD: 3.9 % (ref 0–5)
ERYTHROCYTE [DISTWIDTH] IN BLOOD BY AUTOMATED COUNT: 15.6 % (ref 11.5–14.5)
GRANULOCYTES NFR BLD: 71.3 % (ref 42.2–75.2)
HCT VFR BLD CALC: 32.2 % (ref 37–47)
LYMPHOCYTES # BLD: 1 /CUMM (ref 1.2–3.4)
MCH RBC QN AUTO: 34.8 PG (ref 27–31)
MCHC RBC AUTO-ENTMCNC: 33.5 G/DL (ref 33–37)
MCV RBC AUTO: 103.9 FL (ref 81–99)
MONOCYTES # BLD: 0.5 /CUMM (ref 0.1–0.6)
PLATELET # BLD: 96 /CUMM (ref 130–400)
PMV BLD AUTO: 8.6 FL (ref 7.4–10.4)
PROTHROMBIN TIME: 15.7 SEC (ref 9.4–12.5)
RED BLOOD CELL CT: 3.1 /CUMM (ref 4.2–5.4)
WBC # BLD AUTO: 6.1 /CUMM (ref 4.8–10.8)

## 2018-05-28 NOTE — PN- CARDIOLOGY
Subjective
Subjective:
* Doing well without complaints.
* Stable H/H
 
Objective
Vital Signs and I&Os
Vital Signs
 
 
Date Time Temp Pulse Resp B/P B/P Pulse O2 O2 Flow FiO2
 
     Mean Ox Delivery Rate 
 
05/28 1000  95 20 130/80     
 
05/28 0919  97  138/80     
 
05/28 0918  97  138/80     
 
05/28 0800 97.3 97 20 138/80     
 
05/28 0800 97.3 97 20 138/80  93 Room Air  
 
05/28 0000 97.8 76 20 108/72     
 
05/28 0000      96 Room Air Room Air 
 
05/27 2300 97.2 72 20 108/74  96 Room Air Room Air 
 
05/27 2116  76 20 108/72     
 
05/27 2000 97.2 71 20 112/74     
 
05/27 1600      95 Room Air  
 
05/27 1600 98.0 68 20 100/42  95 Room Air  
 
 
 Intake & Output
 
 
 05/28 1600 05/28 0800 05/28 0000 05/27 1600 05/27 0800 05/27 0000
 
Intake Total  120 120 276 143.2 1460.9
 
Output Total 700 200 200 
 
Balance -700 -80 -80 -424 -756.8 60.9
 
       
 
Intake, Blood      330
 
Product      
 
Intake, IV    176 143.2 170.9
 
Intake, Oral  120 120 100 0 960
 
Number  0 2  0 0
 
Bowel      
 
Movements      
 
Output, Urine 700 200 200 
 
Patient    281 lb  
 
Weight      
 
Weight    Bed scale  
 
Measurement      
 
Method      
 
 
 
Physical Exam:
General: WD/obses female in NAD; alert and oriented x 3
Skin: diffuse purpura
HEENT: NC/AT, PERRL, EOMI
Neck: no JVD, no carotid bruit
Heart: irregularly irregular
Lungs: clear bilaterally
Abdomen: soft, NT, +ve bowel sounds
Extremities: no edema
 
Assessment/Plan
Assessment/Plan
* This patient has chronic atrial fibrillation and ideally should be on chronic 
anticoagulation for stroke prophylaxis. She, for the most part tolerates this 
but, recently, has had a supratherapeutic INR combined with NSAID use. As such, 
she has GI bleeding and diffuse purpura. The patient was taking NSAIDS for foot 
pain and possible gout. I would treat this with allopurinol and colchicine. 
* No active bleeding noted on endoscopy. Restart chronic anticoagulation with 
Eliquis 2.5mg BID . Would not employ coumadin since control was poor. No NSAIDS.
* Good rate control on current medications.
* Okay for DC from a cardiac standpoint with follow up in office in one week.
Continue telemetry? No

## 2018-05-28 NOTE — PN- GASTROENTEROLOGY
Assessment/Plan GI
Assessment/Recommendations:
ASSESSMENT:
1.  2 small linear gastric ulcers likely due to NSAIDs
2.  Atrophic gastritis
3.  Acute blood loss anemia, H&H stable
4.  Atrial fibrillation can resume anticoagulation.  Started on Eliquis by 
cardiology
5.  Foot pain --started on colchicine and allopurinol by cardiology for probable
gout.
 
RECOMMENDATIONS:
1.  Patient stable for discharge
2.  Patient follow-up with Dr. Josue Horne
3.  Will await pathology from biopsies if positive for H. pylori will treat
4.  Patient to be discharged on Protonix 40 mg by mouth twice a day which she 
will take 30-45 minutes before breakfast and 30-45 minutes before dinner.  This 
is been discussed with patient.
5.  Patient to avoid NSAIDs
 
 
Subjective
Subjective:
Ms. Choudhury is doing quite well.  She is tolerating a diet.  She has had no 
further melenic stools.  Her H&H has remained stable.
 
Objective
Vital Signs and I&Os
Vital Signs
 
 
Date Time Temp Pulse Resp B/P B/P Pulse O2 O2 Flow FiO2
 
     Mean Ox Delivery Rate 
 
05/28 1000  95 20 130/80     
 
05/28 0919  97  138/80     
 
05/28 0918  97  138/80     
 
05/28 0800 97.3 97 20 138/80     
 
05/28 0800 97.3 97 20 138/80  93 Room Air  
 
05/28 0000 97.8 76 20 108/72     
 
05/28 0000      96 Room Air Room Air 
 
05/27 2300 97.2 72 20 108/74  96 Room Air Room Air 
 
05/27 2116  76 20 108/72     
 
05/27 2000 97.2 71 20 112/74     
 
05/27 1600      95 Room Air  
 
05/27 1600 98.0 68 20 100/42  95 Room Air  
 
 
 Intake & Output
 
 
 05/28 1600 05/28 0400 05/27 1600 05/27 0400 05/26 1600 05/26 0400
 
Intake Total 120 120 419.2 1460.9 1625.6 0
 
Output Total  1400 900 
 
Balance -780 -80 -1180.8 60.9 725.6 0
 
       
 
Intake, Blood    330 340 
 
Product      
 
Intake, IV   319.2 170.9 1285.6 0
 
Intake, Oral 120 120 100 960 0 
 
Number 0 2 0 0 0 
 
Bowel      
 
Movements      
 
Output, Urine  1400 900 
 
Patient   281 lb   288 lb
 
Weight      
 
Weight   Bed scale   Bed scale
 
Measurement      
 
Method      
 
 
 
 
Physical Exam
General Appearance: well developed/nourished, no apparent distress, comfortable
Respiratory: normal breath sounds, lungs clear
Cardiovascular: irregularly irregular, normal S1 and S2 without rub, murmur, or 
gallop
Abdomen: normal bowel sounds, soft, non-tender, no organomegaly
Extremities: no edema
Neurologic/Psychiatric: oriented x 3, normal mood/affect
Skin: intact, normal color, warm/dry
Current Medications:
 Current Medications
 
 
  Sig/Andrea Start time  Last
 
Medication Dose Route Stop Time Status Admin
 
Albuterol Sulfate 2 PUF Q4P PRN 05/26 1115 AC 05/28
 
  INH   0516
 
Atorvastatin Calcium 10 MG DAILY 05/28 0900 AC 05/28
 
  PO   0918
 
Cholecalciferol 1,000 IU DAILY 05/27 1859 AC 05/28
 
  PO   0917
 
Diltiazem HCl 180 MG DAILY 05/26 1600 AC 05/28
 
  PO   0918
 
Furosemide 40 MG DAILY 05/28 0900 AC 
 
  PO   
 
Gabapentin 300 MG TID 05/26 0900 AC 05/28
 
  PO   0918
 
Insulin Aspart 0 TIDAC 05/28 0800 AC 05/28
 
  SC   1149
 
Insulin Aspart 4 UNITS .STK-MED ONE 05/27 1833 DC 
 
  SC 05/27 1834  
 
Insulin Human Regular 0 Q6 05/25 2359 MT 05/26
 
  SC   1805
 
Lisinopril 10 MG DAILY 05/28 0900 AC 05/28
 
  PO   0918
 
Lorazepam 0 Q1P PRN 05/26 0230 AC 05/27
 
  IV   0727
 
Magnesium Oxide 400 MG DAILY 05/28 0900 AC 05/28
 
  PO   0918
 
Magnesium Oxide 400 MG ONE ONE 05/28 0900 CAN 
 
  PO 05/28 0901  
 
Metoprolol Tartrate 100 MG BID 05/27 2100 AC 05/28
 
  PO   0919
 
Pantoprazole Sodium 40 MG BID 05/27 2100 AC 05/28
 
  IV   0919
 
Pantoprazole Sodium 40 MG Q5H 05/25 2300 DC 05/27
 
Sodium Chloride 100 ML IV   1049
 
Potassium Chloride 10 MEQ DAILY 05/28 0900 AC 05/28
 
  PO   0918
 
Tramadol HCl 50 MG BID PRN 05/26 2100 AC 05/28
 
  PO   0511
 
 
 
 
Results
Pertinent Lab Results:
 Laboratory Tests
 
 
 05/28 05/27
 
 1134 0300
 
Chemistry  
 
  Sodium (137 - 145 mmol/L) 144 140
 
  Potassium (3.5 - 5.1 mmol/L) 4.2 4.0
 
  Chloride (98 - 107 mmol/L) 105 103
 
  Carbon Dioxide (22 - 30 mmol/L) 25 27
 
  Anion Gap (5 - 16) 13 10
 
  BUN (7 - 17 mg/dL) 19  H 29  H
 
  Creatinine (0.5 - 1.0 mg/dL) 1.1  H 1.2  H
 
  Estimated GFR (>60 ml/min) 50  L 45  L
 
  Glucose (65 - 99 mg/dL) 216  H 86
 
  Calcium (8.4 - 10.2 mg/dL) 9.1 9.1
 
  Phosphorus (2.5 - 4.5 mg/dL) 2.6 4.4
 
  Magnesium (1.6 - 2.3 mg/dL) 1.3  L 1.6
 
  Total Bilirubin (0.2 - 1.3 mg/dL) 0.9 0.7
 
  AST (14 - 36 U/L) 27 24
 
  ALT (9 - 52 U/L) 26 33
 
  Albumin (3.5 - 5.0 g/dL) 3.5 3.3  L
 
Coagulation  
 
  PT (9.4 - 12.5 SEC) 15.7  H 18.6  H
 
  INR (0.90 - 1.19) 1.44  H 1.70  H
 
  APTT (25 - 37 SEC) 21  L 35
 
Hematology  
 
  CBC w Diff NO MAN DIFF REQ NO MAN DIFF REQ
 
  WBC (4.8 - 10.8 /CUMM) 6.1 3.7  L
 
  RBC (4.20 - 5.40 /CUMM) 3.10  L 2.95  L
 
  Hgb (12.0 - 16.0 G/DL) 10.8  L 10.1  L
 
  Hct (37 - 47 %) 32.2  L 30.4  L
 
  MCV (81.0 - 99.0 FL) 103.9  H 103.1  H
 
  MCH (27.0 - 31.0 PG) 34.8  H 34.4  H
 
  MCHC (33.0 - 37.0 G/DL) 33.5 33.4
 
  RDW (11.5 - 14.5 %) 15.6  H 15.7  H
 
  Plt Count (130 - 400 /CUMM) 96  L 84  L
 
  MPV (7.4 - 10.4 FL) 8.6 8.2
 
  Gran % (42.2 - 75.2 %) 71.3 54.0
 
  Lymphocytes % (20.5 - 51.1 %) 16.7  L 28.9
 
  Monocytes % (1.7 - 9.3 %) 7.5 9.8  H
 
  Eosinophils % (0 - 5 %) 3.9 7.0  H
 
  Basophils % (0.0 - 2.0 %) 0.6 0.3
 
  Absolute Granulocytes (1.4 - 6.5 /CUMM) 4.4 2.0
 
  Absolute Lymphocytes (1.2 - 3.4 /CUMM) 1.0  L 1.1  L
 
  Absolute Monocytes (0.10 - 0.60 /CUMM) 0.5 0.4
 
  Absolute Eosinophils (0.0 - 0.7 /CUMM) 0.2 0.3
 
  Absolute Basophils (0.0 - 0.2 /CUMM) 0 0
 
 
 
 
 05/26 05/26 05/26
 
 1850 1545 1236
 
Coagulation   
 
  PT (9.4 - 12.5 SEC) 22.9  H 29.7  H 
 
  INR (0.90 - 1.19) 2.09  H 2.70  H 
 
  APTT (25 - 37 SEC)  46  H 
 
Hematology   
 
  CBC w Diff   NO MAN DIFF REQ
 
  WBC (4.8 - 10.8 /CUMM)   3.5  L
 
  RBC (4.20 - 5.40 /CUMM)   3.04  L
 
  Hgb (12.0 - 16.0 G/DL)   10.5  L
 
  Hct (37 - 47 %)   31.2  L
 
  MCV (81.0 - 99.0 FL)   102.5  H
 
  MCH (27.0 - 31.0 PG)   34.6  H
 
  MCHC (33.0 - 37.0 G/DL)   33.8
 
  RDW (11.5 - 14.5 %)   15.8  H
 
  Plt Count (130 - 400 /CUMM)   94  L
 
  MPV (7.4 - 10.4 FL)   8.4
 
  Gran % (42.2 - 75.2 %)   45.6
 
  Lymphocytes % (20.5 - 51.1 %)   35.8
 
  Monocytes % (1.7 - 9.3 %)   9.3
 
  Eosinophils % (0 - 5 %)   8.5  H
 
  Basophils % (0.0 - 2.0 %)   0.8
 
  Absolute Granulocytes (1.4 - 6.5 /CUMM)   1.6
 
  Absolute Lymphocytes (1.2 - 3.4 /CUMM)   1.2
 
  Absolute Monocytes (0.10 - 0.60 /CUMM)   0.3
 
  Absolute Eosinophils (0.0 - 0.7 /CUMM)   0.3
 
  Absolute Basophils (0.0 - 0.2 /CUMM)   0
 
 
 
 
 05/26 05/26
 
 0622 0600
 
Coagulation  
 
  PT  Cancelled
 
  INR  Cancelled
 
Toxicology  
 
  Urine Opiates Screen (>2000 NG/ML) 138 
 
  Methadone Screen (>300 NG/ML) 71 
 
  Barbiturate Screen (>200 NG/ML) < 60 
 
  Ur Phencyclidine Scrn (>25 NG/ML) 7.50 
 
  Amphetamines Screen (>1000 NG/ML) < 100 
 
  U Benzodiazepines Scrn (>200 NG/ML) < 85 
 
  Urine Cocaine Screen (>300 NG/ML) < 50 
 
  Urine Cannabis Screen (>50 NG/ML) < 5.00 
 
Urines  
 
  Urine Color (YEL,AMB,STR) YEL 
 
  Urine Clarity (CLEAR) CLEAR 
 
  Urine pH (5.0 - 8.0) 6.0 
 
  Ur Specific Gravity (1.001 - 1.035) 1.015 
 
  Urine Protein (NEG,<30 MG/DL) NEG 
 
  Urine Ketones (NEG) NEG 
 
  Urine Nitrite (NEG) NEG 
 
  Urine Bilirubin (NEG) NEG 
 
  Urine Urobilinogen (0.1  -  1.0 EU/dl) 0.2 
 
  Ur Leukocyte Esterase (NEG) SMALL  H 
 
  Ur Microscopic SEDIMENT EXAMINED 
 
  Urine WBC (0 - 2 /HPF) 3-5  H 
 
  Ur Epithelial Cells (NONE,FEW) FEW 
 
  Urine Bacteria (NEG/NONE) FEW  H 
 
  Hyaline Casts (0/LPF) FEW  H 
 
  Urine Hemoglobin (NEG) NEG 
 
  Urine Glucose (N MG/DL) NEG 
 
 
 
 
 05/26 05/26
 
 0352 0300
 
Chemistry  
 
  Sodium (137 - 145 mmol/L) 140 
 
  Potassium (3.5 - 5.1 mmol/L) 4.2 
 
  Chloride (98 - 107 mmol/L) 103 
 
  Carbon Dioxide (22 - 30 mmol/L) 26 
 
  Anion Gap (5 - 16) 12 
 
  BUN (7 - 17 mg/dL) 39  H 
 
  Creatinine (0.5 - 1.0 mg/dL) 2.0  H 
 
  Estimated GFR (>60 ml/min) 25  L 
 
  Glucose (65 - 99 mg/dL) 100  H 
 
  Calcium (8.4 - 10.2 mg/dL) 9.0 
 
  Phosphorus (2.5 - 4.5 mg/dL) 5.3  H 
 
  Magnesium (1.6 - 2.3 mg/dL) 1.8 
 
  Total Bilirubin (0.2 - 1.3 mg/dL) 0.6 
 
  AST (14 - 36 U/L) 21 
 
  ALT (9 - 52 U/L) 25 
 
  Troponin I (< 0.11 ng/ml) < 0.01 
 
  Albumin (3.5 - 5.0 g/dL) 3.1  L 
 
Coagulation  
 
  PT (9.4 - 12.5 SEC) 63.0 *H 
 
  INR (0.90 - 1.19) 5.68 *H 
 
  APTT  Cancelled
 
Hematology  
 
  CBC w Diff NO MAN DIFF REQ 
 
  WBC (4.8 - 10.8 /CUMM) 4.7  L 
 
  RBC (4.20 - 5.40 /CUMM) 3.02  L 
 
  Hgb (12.0 - 16.0 G/DL) 10.5  L 
 
  Hct (37 - 47 %) 30.7  L 
 
  MCV (81.0 - 99.0 FL) 101.6  H 
 
  MCH (27.0 - 31.0 PG) 34.8  H 
 
  MCHC (33.0 - 37.0 G/DL) 34.2 
 
  RDW (11.5 - 14.5 %) 15.6  H 
 
  Plt Count (130 - 400 /CUMM) 106  L 
 
  MPV (7.4 - 10.4 FL) 8.6 
 
  Gran % (42.2 - 75.2 %) 41.1  L 
 
  Lymphocytes % (20.5 - 51.1 %) 40.2 
 
  Monocytes % (1.7 - 9.3 %) 9.7  H 
 
  Eosinophils % (0 - 5 %) 8.3  H 
 
  Basophils % (0.0 - 2.0 %) 0.7 
 
  Absolute Granulocytes (1.4 - 6.5 /CUMM) 1.9 
 
  Absolute Lymphocytes (1.2 - 3.4 /CUMM) 1.9 
 
  Absolute Monocytes (0.10 - 0.60 /CUMM) 0.5 
 
  Absolute Eosinophils (0.0 - 0.7 /CUMM) 0.4 
 
  Absolute Basophils (0.0 - 0.2 /CUMM) 0 
 
 
 
 
 05/25
 
 2154
 
Chemistry 
 
  Sodium (137 - 145 mmol/L) 142
 
  Potassium (3.5 - 5.1 mmol/L) 4.5
 
  Chloride (98 - 107 mmol/L) 99
 
  Carbon Dioxide (22 - 30 mmol/L) 23
 
  Anion Gap (5 - 16) 20  H
 
  BUN (7 - 17 mg/dL) 40  H
 
  Creatinine (0.5 - 1.0 mg/dL) 2.1  H
 
  Estimated GFR (>60 ml/min) 24  L
 
  BUN/Creatinine Ratio (7 - 25 %) 19.0
 
  Glucose (65 - 99 mg/dL) 140  H
 
  Calcium (8.4 - 10.2 mg/dL) 9.3
 
  Magnesium (1.6 - 2.3 mg/dL) 1.7
 
  Iron (37 - 170 ug/dL) 132
 
  TIBC (265 - 497 ug/dL) 337
 
  Ferritin (11.1 - 264 ng/mL) 189.0
 
  Total Bilirubin (0.2 - 1.3 mg/dL) 0.8
 
  AST (14 - 36 U/L) 33
 
  ALT (9 - 52 U/L) 23
 
  Alkaline Phosphatase (<127 U/L) 91
 
  Troponin I (< 0.11 ng/ml) < 0.01
 
  Total Protein (6.3 - 8.2 g/dL) 6.3
 
  Albumin (3.5 - 5.0 g/dL) 3.8
 
  Globulin (1.9 - 4.2 gm/dL) 2.5
 
  Albumin/Globulin Ratio (1.1 - 2.2 %) 1.5
 
  Vitamin B12 (239 - 931 pg/mL) 476
 
  Folate (2.76 - 20.0 ng/mL) > 20.0  H
 
  TSH (0.270 - 4.200 uIU/mL) 9.550  H
 
  Free T4 (0.78 - 2.44 ng/dL) 0.92
 
Coagulation 
 
  PT (9.4 - 12.5 SEC) 60.4 *H
 
  INR (0.90 - 1.19) 5.45 *H
 
  APTT (25 - 37 SEC) 57  H
 
Hematology 
 
  CBC w Diff NO MAN DIFF REQ
 
  WBC (4.8 - 10.8 /CUMM) 5.1
 
  RBC (4.20 - 5.40 /CUMM) 3.35  L
 
  Hgb (12.0 - 16.0 G/DL) 11.6  L
 
  Hct (37 - 47 %) 34.1  L
 
  MCV (81.0 - 99.0 FL) 101.6  H
 
  MCH (27.0 - 31.0 PG) 34.7  H
 
  MCHC (33.0 - 37.0 G/DL) 34.2
 
  RDW (11.5 - 14.5 %) 15.7  H
 
  Plt Count (130 - 400 /CUMM) 129  L
 
  MPV (7.4 - 10.4 FL) 8.3
 
  Gran % (42.2 - 75.2 %) 52.0
 
  Lymphocytes % (20.5 - 51.1 %) 32.0
 
  Monocytes % (1.7 - 9.3 %) 8.7
 
  Eosinophils % (0 - 5 %) 6.5  H
 
  Basophils % (0.0 - 2.0 %) 0.8
 
  Absolute Granulocytes (1.4 - 6.5 /CUMM) 2.6
 
  Absolute Lymphocytes (1.2 - 3.4 /CUMM) 1.6
 
  Absolute Monocytes (0.10 - 0.60 /CUMM) 0.4
 
  Absolute Eosinophils (0.0 - 0.7 /CUMM) 0.3
 
  Absolute Basophils (0.0 - 0.2 /CUMM) 0
 
Toxicology 
 
  Serum Alcohol (<10 MG/DL) 18.0

## 2018-05-28 NOTE — PN- RESIDENT CRCU
Subjective
HPI/CRCU Issues:
EGD performed yesterday. Pt agreed to take Noac.
 
Objective
 
Vital Signs & I&O
Last 8 Hrs of Vitals and I&O:
 Laboratory Tests
 
 
 
18 1134:
Sodium Pending, Potassium Pending, Chloride Pending, Carbon Dioxide Pending, 
Anion Gap Pending, BUN Pending, Creatinine Pending, Glucose Pending, Calcium 
Pending, Phosphorus Pending, Magnesium Pending, Total Bilirubin Pending, AST 
Pending, ALT Pending, Albumin Pending, PT Pending, INR Pending, APTT Pending, 
CBC w Diff Pending, WBC Pending, RBC Pending, Hgb Pending, Hct Pending, MCV 
Pending, MCH Pending, MCHC Pending, RDW Pending, Plt Count Pending, MPV Pending
 Vital Signs
 
 
Date Time Temp Pulse Resp B/P B/P Pulse O2 O2 Flow FiO2
 
     Mean Ox Delivery Rate 
 
 1000  95 20 130/80     
 
 0919  97  138/80     
 
 0918  97  138/80     
 
 0800 97.3 97 20 138/80     
 
 0800 97.3 97 20 138/80  93 Room Air  
 
 
 
 
Exam
General Appearance: alert, awake, anxious, obese
Respiratory: normal breath sounds
Cardiovascular: irregularly irregular
Gastrointestinal: normal bowel sounds, soft, non-tender
Extremities: 1+ radial pulses. 1+ LE edema
Current Medications:
 Current Medications
 
 
  Sig/Andrea Start time  Last
 
Medication Dose Route Stop Time Status Admin
 
Albuterol Sulfate 2 PUF Q4P PRN  1115 AC 
 
  INH   0516
 
Atorvastatin Calcium 10 MG DAILY  0900 AC 
 
  PO   0918
 
Cholecalciferol 1,000 IU DAILY  1859 AC 
 
  PO   0917
 
Diltiazem HCl 180 MG DAILY  1600 AC 
 
  PO   0918
 
Furosemide 40 MG DAILY  0900 AC 
 
  PO   
 
Gabapentin 300 MG TID  0900 AC 
 
  PO   0918
 
Insulin Aspart 0 TIDAC  0800 AC 
 
  SC   0836
 
Insulin Aspart 4 UNITS .STK-MED ONE  1833 DC 
 
  SC  1834  
 
Insulin Human Regular 0 Q6  2359 DC 
 
  SC   1805
 
Lisinopril 10 MG DAILY  0900 AC 
 
  PO   0918
 
Lorazepam 0 Q1P PRN  0230 AC 
 
  IV   0727
 
Magnesium Oxide 400 MG DAILY  09 AC 
 
  PO   0918
 
Magnesium Oxide 400 MG ONE ONE  09 CAN 
 
  PO  09  
 
Metoprolol Tartrate 100 MG BID  2100 AC 
 
  PO   09
 
Pantoprazole Sodium 40 MG BID  2100 AC 
 
  IV   0919
 
Pantoprazole Sodium 40 MG Q5H  2300 DC 
 
Sodium Chloride 100 ML IV   1049
 
Potassium Chloride 10 MEQ DAILY  09 AC 
 
  PO   0918
 
Tramadol HCl 50 MG BID PRN  2100 AC 
 
  PO   0511
 
 
 
 
Impression/Plan
 
Impression/Problem List
Impression:
A:
 
67-year-old lady with a history of diarrhea predominant IBS, on coumadin for 
afib, with current use of NSAIDs presents for evaluation of bright red blood in 
the setting of supratherapeutic INR. 
 
P:
 
#GI bleed in the setting of NSAID use and elevated INR
History of dark stool in the context of NSAID use and a supratherapeutic INR is 
concerning for upper GI bleed from NSAID induced ulcer precipitated by the 
elevated INR. She has an elevated BUN. Elevated BUN gives further credence to a 
more likely upper GI Bleed. 
Received 2 units of fluids
EGD revaeled: Diffuse gastric mucosal atrophy with erosion and linear clean-
based gastric ulcers
Intial H/H 11.6/34.1
Initial inr 5.45. Received 10 vit k and 3 untsi ffp.
 
-patient agreed to switch from warfarin to Eliquis
-cont po protonix BID before dinner and breakfast
-f/u with GI outpatient
   
#etoh abuse
Her daughter does report that she drinks about 4-5 glasses of wine/day.Clinical 
significance as per to large amounts of intermittent alcohol consumption 
associated with elevated INR. 
 
-cont ciwa protocol and ativan iv prn
 
#SHANNAN
Baseline Cr 1.0, Admission Cr 2.1
Has received 2L+ of fluids thus far
Current Ct 1.1
 
-cont to monitor
 
 
#Thrombocytopenia. 
Appears to be chronic.Prob splenic sequestration
-needs follow up
 
#afib
-She agreed to switch from warfarin to Eliquis as noted above. She will be 
discharged with 2.5 mg twice a day per her preference. Given her recent GI bleed
cardiology also recommended starting at 2.5mg BID and increasing to 5BID during 
her follow up cardiology visit. She was given a one-month trial box she was 
advised to follow-up with cardiology in one week.
 
 
#History of chronic diseases:IBS, gout, hypertension, diabetes, COPD, CHF.
-lisinopril, lasix, metoprolol, cardizem restarted as bp improved.
-cont atorvastatin, tramadol, albuterol, gabapentin
-cont novolog
 
Heart healthy diet
DVT prophylaxis: Alps
CODE STATUS: full code
Problem List:
 1. GI bleed
 
Pain Ratin
Tomorrow's Labs & Rationales:
none
 
Plan
DVT/Prophylaxis: mechanical
Code Status: Full Code

## 2018-05-28 NOTE — PN- PULMONARY
Subjective
HPI/Critical Care Issues:
Seen and examined
Stable
Pt ready to go home
No further head ache which she had yesterday
No further Bleed noted
 
 
Objective
 
Vital Signs & I&O
Last 24 Hrs of Vitals and I&O:
 Vital Signs
 
 
Date Time Temp Pulse Resp B/P B/P Pulse O2 O2 Flow FiO2
 
     Mean Ox Delivery Rate 
 
05/28 1000  95 20 130/80     
 
05/28 0919  97  138/80     
 
05/28 0918  97  138/80     
 
05/28 0800 97.3 97 20 138/80     
 
05/28 0800 97.3 97 20 138/80  93 Room Air  
 
05/28 0000 97.8 76 20 108/72     
 
05/28 0000      96 Room Air Room Air 
 
05/27 2300 97.2 72 20 108/74  96 Room Air Room Air 
 
05/27 2116  76 20 108/72     
 
05/27 2000 97.2 71 20 112/74     
 
05/27 1600      95 Room Air  
 
05/27 1600 98.0 68 20 100/42  95 Room Air  
 
05/27 1200       Room Air  
 
05/27 1200  80 21 128/69     
 
 
 Intake & Output
 
 
 05/28 1600 05/28 0800 05/28 0000
 
Intake Total  120 120
 
Output Total  200 200
 
Balance  -80 -80
 
    
 
Intake, Oral  120 120
 
Number  0 2
 
Bowel   
 
Movements   
 
Output, Urine  200 200
 
 
 
 
Impression/Plan
 
Impression/Plan
Impression/Plan:
EGD
Findings:
1.  Diffuse gastric mucosal atrophy with erosion
2.  2 linear clean-based gastric ulcers
 
Impression:
1.  Diffuse gastric mucosal atrophy with erosion
2.  2 linear clean-based gastric ulcers
 
Recommendations:
1.  Change Protonix to 40 mg by mouth every morning
2.  Patient to be advised to avoid NSAIDs
3.  Advance diet
4.  Patient stable for discharge to home if diet tolerated
5.  Patient follow-up with Dr. Nuñez for biopsy review and further therapy is
needed.
 
SIGNIFICANT DATA
Creatinine stabilized to 1.2 yesterday
No blood work today needs to be done
Her INR is now 1.7
She is MRSA positive by nasal swab
Chest x-ray done yesterday showed no significant pneumonia
 
General Appearance: alert, awake, comfortable
Head: atraumatic, normal appearance
Eyes:
Bilateral: normal appearance. 
Ears, Nose, Throat: hearing grossly normal
Neck: supple, full range of motion
Respiratory: normal breath sounds, lungs clear
Cardiovascular: regular rate/rhythm, Normal S1 and S2 without rub, murmur or 
gallop
Gastrointestinal: normal bowel sounds, soft, non-tender, no organomegaly
Extremities: normal inspection
Neurologic/Psych: no motor/sensory deficits, alert, oriented x 3
Cranial Nerves: Cranial Nerves II-XII grossly intact
Skin: ecchymosis, eccymoses located across anterior abdomen
 
IMPRESSION
This is a lady with paroxysmal atrial fibrillation on warfarin, significant 
alcohol use, recent supratherapeutic INR, NSAID use in the recent past came in 
with multiple bruises in the abdominal wall and was found to have dark in 
positive stool.  Her issues include
* Resolved Acute blood loss anemia with melena related to upper GI bleed.  This 
may be related to coagulopathy and recent NSAID use compounded by alcohol.  
Patient seems to be better with no evidence of active bleeding. EGD as above and
stable
* Coagulopathy due to anticoagulation use with warfarin
* Low platelets due to prob splenic sequestration needs follow up
* Sig Alcohol use, pt counselled
* Chronic kidney disease with acute renal insufficiency slowly improving
* Initial hypotension now resolved
* Thrombocytopenia with chronic alcohol use which needs to be worked up if low 
as out pt
* Paroxysmal atrial fibrillation, was on anticoag with coumadin and pt does not 
want NOAC as she wishes meds which require monitoring
* RExolved Mild headache today which appears to be chronic
* Morbid obesity
 
 
PLAN
Check blood work today and if stable pt absolutely wishes to be dcd today at all
costs
Continue her current medication
PO ppi
Needs out pt anticoag and pt absolutly wishes no NOACS and wishes to restart 
warfarin not at this time as she is afraid of bleeding- accepts all risks (check
inr and start coumadin at her home dose at 50-60 percent if ok with cardio in 
the next day or two after dc, Pt promises to follow up with warfarin clinic, and
cardio as out pt)
PT says she will refrain from etoh and nsaid and she was educated extensively 
about this by me today
OK to dc will follow closely if blood is ok

## 2018-05-28 NOTE — DISCHARGE SUMMARY
Visit Information
 
Visit Dates
Admission Date:
05/25/18
 
Discharge Date:
05/28/18
 
 
Hospital Course
 
Course
Attending Physician:
Frandy Limon MD
 
Primary Care Physician:
Corey CAMARILLO,Bryce GIL
 
Hospital Course:
A:
 
67-year-old F with a history of IBS-diarrhea predominant , afib on coumadin 
presenting for bright red blood per rectum in the setting of supratherapeutic 
INR with current use of NSAIDs.
P:
 
#GI bleed in the setting of NSAID use and elevated INR
Her history of dark stools in the context of NSAID use and a supratherapeutic 
INR is concerning for upper GI bleed from NSAID induced ulcer precipitated by 
the elevated INR. Her intial H/H was 11.6/34.1 and inr was 5.45. She received 
vit k and ffp. She was found to be hypotensive and initially received 2L fluid 
bolus. Elevated BUN gives further credence to a a GI bleed.  She was seen by GI 
and EGD revealed diffuse gastric mucosal atrophy with erosion and linear clean-
based gastric ulcers. The patient was advised and switch from warfarin to 
Eliquis. She was advised to increase her protonix to BID (before dinner and 
breakfast). She was advised to follow up with GI.
 
#Afib
She agreed to switch from warfarin to Eliquis as noted above. She will be 
discharged with 2.5 mg twice a day per her preference. Given her recent GI bleed
cardiology also recommended starting at 2.5mg BID and increasing to 5BID during 
her follow up cardiology visit. She was given a one-month trial box she was 
advised to follow-up with cardiology in one week.
 
#Hx of etoh abuse
Her daughter reports that the patient drinks about 4-5 glasses of wine/day. This
is clinically significant as large amounts of alcohol consumption is associated 
with elevated INR and patients on warfarin.  She was monitored on a CIWA 
protocol and Ativan when necessary.  She was advised to decrease her alcohol 
intake.
 
#SHANNAN
The patient presented with a creatinine of 2.1.  Her SHANNAN resolved after the 2L+ 
bolus of fluids.
 
#Thrombocytopenia. 
Appears to be chronic. Prob splenic sequestration. Requires outpatient follow up
 
#History of chronic diseases:IBS, gout, hypertension, diabetes, COPD, CHF.
Continued lisinopril, lasix, metoprolol, cardizem restarted as bp improved.
Continued atorvastatin, tramadol, albuterol, gabapentin.
Treated with NovoLog sliding scale for diabetes during inpatient
Allergies:
Coded Allergies:
aspirin (NAUSEA 07/31/16)
colchicine (SEVERE DIARRHEA 05/25/18)
shellfish derived (RED FACED 07/31/16)
 
Significant Procedures:
Endoscopy Procedure
Medical History: unchanged
Mental Status: alert/oriented
Heart/Lung Eval Prior to Sedation: within normal limits
Candidate for Sedation? Yes
Procedure Date: 05/27/18
Procedure Type: EGD w/biopsy
:
MD Savannah, Anyi ROJAS
ASA Classification: III
Indications:
1.  Melena
2.  Acute Blood Loss Anemia
3.  Chronic Use NSAIDs
 
Instrument: diagnostic gastroscope
Meds Received: MAC
Patient's Tolerance: good
Complications: none
Extent Reached: second part of duodenum
Procedure:
Note: Informed consent was obtained prior to procedure.  Risks and benefits of 
procedure were discussed with patient.  Potential complications discussed 
included perforation, bleeding, abdominal pain, and adverse reaction to 
medications.  It was explained that iany or all of these complications could 
result in the need for extended hospitalization, emergency surgery, transfusion 
of packed red blood cells (with the risk of HIV or hepatitis virus), intubation 
with mechanical ventilation, and possible need for antibiotics.  It was further 
explained that an existing tumor polyp or mucosal abnormality might not be 
identified at the time of the procedure thus resulting in a missed opportunity 
for early diagnosis and treatment of a gastrointestinal malignancy or disease 
with possible interval development of a gastrointestinal cancer or other disease
with possible worsening of clinical condition in the interval between 
endoscopies.  It was also discussed that complications are not limited to those 
listed above.  Possible alternatives to endoscopic treatment or evaluation were 
discussed.  All questions were answered.
 
Continuous EKG and blood pressure monitors were attached.  Supplemental oxygen 
was provided with O2 Sat monitoring.  
 
Patient was placed in the left lateral decubitus position.  A surgical timeout 
was performed.  All persons in the room were identified.  All concerns were 
expressed and answered.
 
A bite block was placed in the mouth and sedation was administered by anesthesia
and titrated to comfort prior to starting the procedure.  The Olympus upper 
endoscope was advanced under direct vision to the level of the third portion of 
the duodenum.
 
Esophagus: The esophagus had a normal mucosal vascular pattern throughout its 
entirety.  The GE junction was identified and was normal.  The Z line was 
located at 40 cm from the incisors and was nondisplaced
 
Stomach: The stomach had diffuse mucosal atrophy throughout its entirety.  
Retroflexed view of the cardiofundic region revealed a normal mucosal and 
vascular pattern.  There were normal rugae and normal distensibility.  The 
pylorus was patent and easily intubated.  Within the antrum there were scattered
erosions with no stigmata of bleeding.  There were 2 linear ulcers above the 
pylorus with [place] exudate and no stigmata of bleeding.  Biopsies were 
obtained from the antrum, and gastric body to rule out H. Pylori.
 
Duodenum: The duodenum was fully examined from bulb down to the third portion.  
There was a normal mucosal vascular pattern throughout.
 
With the endoscope in the forward-viewing position, it was slowly withdrawn and 
all areas were re-inspected and findings are as described previously.  Patient 
tolerated the procedure well.
 
EBL:  Minimal
 
Specimens Removed:  antrum, and gastric body to rule out H. Pylori.
 
Findings:
1.  Diffuse gastric mucosal atrophy with erosion
2.  2 linear clean-based gastric ulcers
 
Impression:
1.  Diffuse gastric mucosal atrophy with erosion
2.  2 linear clean-based gastric ulcers
 
Recommendations:
1.  Change Protonix to 40 mg by mouth every morning
2.  Patient to be advised to avoid NSAIDs
3.  Advance diet
4.  Patient stable for discharge to home if diet tolerated
5.  Patient follow-up with Dr. Nuñez for biopsy review and further therapy is
needed.
 
DICTATED BY: Anyi Nuñez MD 
DATE/TIME DICTATED:05/27/18 / 1310
:JOSÉ LUIS 
DATE/TIME TRANSCRIBED:05/27/18 / 1310
REPORT NUMBER:[id number]
 
Disposition Summary
 
Disposition
Principal Diagnosis:
GI bleed in the setting of NSAID use and elevated INR
 
Additional Diagnosis:
Afib
Hx of etoh abuse
SHANNAN
Thrombocytopenia. 
 
Discharge Disposition: home or self care
 
Discharge Instructions
 
General Discharge Information
Code Status: Full Code
Patient's Diet:
Heart healthy
Patient's Activity:
As tolerated
Follow-Up Instructions/Appts:
Please follow up with your PCP in 1-2 weeks
Please follow up with Dr. Landin in 1 week.
Please follow up with your cardiologist in 1 week.
Please avoid NSAIDs
**Please start your eliquis tonight.
 
Medications at Discharge
Discharge Medications:
Stop taking the following medications:
Warfarin Sodium (Warfarin Sodium) 2 MG TABLET ORAL MoWeFr Qty = 90
 
Warfarin Sodium (Coumadin) 1 MG TABLET ORAL SuTuThSa 
 
Meloxicam (Meloxicam) 7.5 MG TABLET ORAL DAILY 
 
Pantoprazole Sodium (Pantoprazole Sodium) 40 MG TABLET. ORAL DAILY Qty = 90
 
Continue taking these medications:
Diltiazem HCl (Cardizem Cd) 180 MG CAP.ER.24H
    1 Capsule ORAL DAILY
    Qty = 90
    Comments:
       Last Taken: 5/28/18
             Time: 9 AM
 
Metoprolol Tartrate (Metoprolol Tartrate) 100 MG TABLET
    1 Tablet ORAL TWICE DAILY
    Qty = 180
    Comments:
       Last Taken: 5/28/18
             Time: 9 AM
 
Gabapentin (Gabapentin) 300 MG CAPSULE
    1 Capsule ORAL THREE TIMES DAILY
    Qty = 90
    Comments:
       Last Taken: 5/28/18
             Time: 9 AM
 
Insulin Detemir (Levemir Flextouch) 100 UNIT/1 ML INSULN.PEN
    30 Units Inject into fatty tissue Every Morning
    Qty = 15
    Comments:
       NOT TAKEN
 
Cyclobenzaprine HCl (Cyclobenzaprine HCl) 10 MG TABLET
    1 Tablet ORAL TWICE DAILY
    Qty = 90
    Comments:
       NOT GIVEN
 
Hyoscyamine (Levsin) 0.125 MG TABLET
    1 Tablet ORAL THREE TIMES DAILY as needed for ABD CRAMPS
    Qty = 90
    Comments:
       NOT TAKEN
 
Furosemide (Furosemide) 40 MG TABLET
    1 Tablet ORAL DAILY
    Qty = 90
    Comments:
       NOT TAKEN
 
Mometasone Furoate (Nasonex) 17 GM SPRAY.PUMP
    2 Spray Both sides of nose DAILY
    Qty = 17
    Comments:
       NOT GIVEN
 
Rosuvastatin Calcium (Crestor) 10 MG TABLET
    1 Tablet ORAL DAILY
    Qty = 90
    Comments:
       NOT GIVEN
 
Lisinopril (Lisinopril) 10 MG TABLET
    1 Tablet ORAL DAILY
    Qty = 90
    Comments:
       Last Taken: 5/28/18
             Time: 9 AM
 
Latanoprost (Latanoprost) 2.5 ML DROPS
    1 Drop In the eye Every night
    Qty = 8
    Comments:
       NOT TAKEN
 
Albuterol Sulfate (Proair Hfa) 90 MCG HFA.AER.AD
    2 Puff Inhale through mouth EVERY 4-6 HOURS AS NEEDED as needed for 
BREATHING PROBLEMS
    Comments:
       Last Taken: 5/28/18
             Time: 5 AM
 
Multivit-Min/Folic Acid/Biotin (Women Multivit W-Biotin Gummy) 200 MCG-300 MCG 
TAB.CHEW
    1 Tablet ORAL DAILY
    Comments:
       NOT GIVEN
 
Potassium Chloride (Potassium Chloride) 10 MEQ TABLET.ER
    1 Tablet ORAL DAILY
    Qty = 180
    Comments:
       Last Taken: 5/28/18
             Time: 9 AM
 
Cholecalciferol (Vitamin D3) 1,000 UNIT TABLET
    1 Tablet ORAL DAILY
    Comments:
       Last Taken: 5/28/18
             Time: 9 AM
 
Vitamin E Mixed (Vitamin E) 400 UNIT TABLET
    1 Tablet ORAL DAILY
    Comments:
       NOT GIVEN
 
Omega-3 Fatty Acids/Fish Oil (Fish Oil 1,200 MG Softgel) 360 MG-1,200 MG CAPSULE
    1 Capsule ORAL TWICE DAILY
    Comments:
       NOT GIVEN
 
Tramadol HCl (Tramadol HCl) 50 MG TABLET
    1 Tablet ORAL 2 x Daily as needed as needed for PAIN
    Qty = 10
    Comments:
       Last Taken: 5/28/18
             Time: 5 AM
 
Magnesium Oxide (Magnesium) 400 MG CAPSULE
    1 Capsule ORAL DAILY
    Qty = 30
    Comments:
       Last Taken: 5/28/18
             Time: 9 AM
 
Tramadol HCl (Tramadol HCl ER) 200 MG TAB.ER.24H
    1 Tablet ORAL Every Morning
    Comments:
       NOT GIVEN
 
Start taking the following new medications:
Pantoprazole Sodium (Protonix) 40 MG TABLET.DR
    1 Tablet ORAL 1/2 HR BEFORE BREAKFAST/DINNER
    Qty = 60
    No Refills
    Comments:
       Last Taken: 5/28/18
             Time: 9 AM
 
Apixaban (Eliquis) 2.5 MG TABLET
    1 Tablet ORAL TWICE DAILY
    Qty = 60
    No Refills
    Comments:
       NOT GIVEN
 
 
Copies To:
Corey CAMARILLO,Bryce GIL

## 2018-06-06 ENCOUNTER — HOSPITAL ENCOUNTER (EMERGENCY)
Dept: HOSPITAL 68 - ERH | Age: 68
End: 2018-06-06
Payer: COMMERCIAL

## 2018-06-06 VITALS — DIASTOLIC BLOOD PRESSURE: 78 MMHG | SYSTOLIC BLOOD PRESSURE: 136 MMHG

## 2018-06-06 DIAGNOSIS — M31.6: Primary | ICD-10-CM

## 2018-06-06 DIAGNOSIS — M54.2: ICD-10-CM

## 2018-06-06 LAB
ABSOLUTE GRANULOCYTE CT: 6.2 /CUMM (ref 1.4–6.5)
BASOPHILS # BLD: 0.1 /CUMM (ref 0–0.2)
BASOPHILS NFR BLD: 0.9 % (ref 0–2)
EOSINOPHIL # BLD: 0 /CUMM (ref 0–0.7)
EOSINOPHIL NFR BLD: 0.4 % (ref 0–5)
ERYTHROCYTE [DISTWIDTH] IN BLOOD BY AUTOMATED COUNT: 15.8 % (ref 11.5–14.5)
GRANULOCYTES NFR BLD: 76.4 % (ref 42.2–75.2)
HCT VFR BLD CALC: 34.4 % (ref 37–47)
LYMPHOCYTES # BLD: 1.1 /CUMM (ref 1.2–3.4)
MCH RBC QN AUTO: 34.9 PG (ref 27–31)
MCHC RBC AUTO-ENTMCNC: 33.9 G/DL (ref 33–37)
MCV RBC AUTO: 102.8 FL (ref 81–99)
MONOCYTES # BLD: 0.7 /CUMM (ref 0.1–0.6)
PLATELET # BLD: 192 /CUMM (ref 130–400)
PMV BLD AUTO: 8.2 FL (ref 7.4–10.4)
RED BLOOD CELL CT: 3.34 /CUMM (ref 4.2–5.4)
WBC # BLD AUTO: 8.1 /CUMM (ref 4.8–10.8)

## 2018-06-06 NOTE — RADIOLOGY REPORT
EXAMINATION:
XR CERVICAL SPINE
 
CLINICAL INFORMATION:
Neck pain.
 
COMPARISON:
Cervical spine 07/14/2017.
 
TECHNIQUE:
Lateral. AP. Odontoid. Swimmer's.
 
FINDINGS:
Vertebrae have normal height, there is no fracture. There is reversal the
normal lordotic curve of the cervical spine. There is degenerative
spondylosis. Disc height narrowing with endplate spurring is pronounced at
C5-C6 and C6-C7. There is multilevel facet joint arthrosis bilaterally, most
severe at the upper cervical spine.
Compared to the prior study of 07/14/2017 there is been no substantial change.
 
IMPRESSION:
There is advanced multilevel spondylosis of the cervical spine . No change
since exam of 07/14/2017. No acute abnormality.

## 2018-06-06 NOTE — ED CARDIAC/CP/PALPITATIONS
History of Present Illness
 
General
Chief Complaint: General Adult
Stated Complaint: BIBA FOR CHEST PAIN; CHF
Source: patient, old records
Exam Limitations: no limitations
 
Vital Signs & Intake/Output
Vital Signs & Intake/Output
 Vital Signs
 
 
Date Time Temp Pulse Resp B/P B/P Pulse O2 O2 Flow FiO2
 
     Mean Ox Delivery Rate 
 
 1610 97.0 76 18 136/78  97 Room Air  
 
 1342 97.0 88 18 140/80  98 Room Air  
 
 
 
Allergies
Coded Allergies:
aspirin (NAUSEA 16)
colchicine (SEVERE DIARRHEA 18)
shellfish derived (RED FACED 16)
 
Reconcile Medications
Albuterol Sulfate (Proair Hfa) 90 MCG HFA.AER.AD   2 PUF INH Q4-6 PRN PRN 
BREATHING PROBLEMS  (Reported)
Apixaban (Eliquis) 2.5 MG TABLET   1 TAB PO BID AFIB
Cholecalciferol (Vitamin D3) 1,000 UNIT TABLET   1 TAB PO DAILY SUPPLEMENT  (
Reported)
Cyclobenzaprine HCl 10 MG TABLET   1 TAB PO BID MUSCLE SPASMS  (Reported)
Diazepam (Valium) 2 MG TABLET   1 TAB PO BID PRN MUSCLE RELAXOR
Diltiazem HCl (Cardizem Cd) 180 MG CAP.ER.24H   1 CAP PO DAILY HEART  (Reported)
Furosemide 40 MG TABLET   1 TAB PO DAILY DIURETIC  (Reported)
Gabapentin 300 MG CAPSULE   1 CAP PO TID NERVE PAIN  (Reported)
Hyoscyamine (Levsin) 0.125 MG TABLET   1 TAB PO TID PRN ABD CRAMPS  (Reported)
Insulin Detemir (Levemir Flextouch) 100 UNIT/1 ML INSULN.PEN   30 UNITS SC QAM 
DIABETES  (Reported)
Latanoprost 2.5 ML DROPS   1 GTT OPH QPM BOTH EYES  (Reported)
Lisinopril 10 MG TABLET   1 TAB PO DAILY BP  (Reported)
Magnesium Oxide (Magnesium) 400 MG CAPSULE   1 CAP PO DAILY hypomagnesemia
Metoprolol Tartrate 100 MG TABLET   1 TAB PO BID HEART/BP  (Reported)
Mometasone Furoate (Nasonex) 17 GM SPRAY.PUMP   2 SPRAY NASB DAILY ALLERGIES  (
Reported)
Multivit-Min/Folic Acid/Biotin (Women Multivit W-Biotin Gummy) 200 MCG-300 MCG 
TAB.CHEW   1 TAB PO DAILY SUPPLEMENT  (Reported)
Omega-3 Fatty Acids/Fish Oil (Fish Oil 1,200 MG Softgel) 360 MG-1,200 MG CAPSULE
  1 CAP PO BID SUPPLEMENT  (Reported)
Pantoprazole Sodium (Protonix) 40 MG TABLET.DR   1 TAB PO BID GI  (Reported)
Potassium Chloride 10 MEQ TABLET.ER   1 TAB PO DAILY SUPPLEMENT  (Reported)
Prednisone 50 MG TABLET   1 TAB PO DAILY TEMPORAL ARTERITIS
Rosuvastatin Calcium (Crestor) 10 MG TABLET   1 TAB PO DAILY CHOLESTEROL  (
Reported)
Tramadol HCl (Tramadol HCl ER) 200 MG TAB.ER.24H   1 TAB PO QAM PAIN  (Reported)
Tramadol HCl 50 MG TABLET   1 TAB PO BIDP PRN PAIN
Vitamin E Mixed (Vitamin E) 400 UNIT TABLET   1 TAB PO DAILY SUPPLEMENT  (
Reported)
 
Triage Note:
67F BIBA FOR CP STARTING YESTERDAY AND NEW ONSET
 NECK PAIN SINCE THIS AM WITH PAIN TO TOUCH AND
 WITH MOVEMENT. MODERATE SWELLING BELOW NEAR C7-T2
 AREA WHICH PT IS UNSURE IF IS HER BASELINE. DENIES
 KNOWN INJURY OR TRAUMA. HX CHF AND AFIB, ARRIVES
 WITH SIGNIFICANT LOWER EXT EDEMA AND REPORTS SHE
 HAS NOT BEEN TAKING HER LASIX DUE TO PAIN. RATE
 CONTROLLED AFIB ON CM ON ARRIVAL. HOSPITALIZED
 LATE MAY FOR GI BLEED WHILE ON COUMADIN, NOW ON
 ELOQUIS 5MG BID
Triage Nurses Notes Reviewed? yes
Onset: Gradual
Duration: getting worse
Timing: recent history
Radiation: no radiation
Activities at Onset: activity
HPI:
Patient is a 67-year-old female with past medical history of atrial fibrillation
currently and requests which she was recently admitted and discharged from 
[place] emergency room approximately 2 weeks ago for concerns of a GI bleed 
likely due to meloxicam patient had endoscopy showing 2 small gastric ulcers 
patient presents emergency room with a 3 DAY history of gradual onset of 
bilateral neck pain with associated symptoms mild headache.  
Patient states that she woke up with neck stiffness denies any trauma or 
mechanism of injury
States movements make worse
Patient is able tolerate by mouth denies any photophobia chest pain arm pain jaw
pain fever chills hemoptysis shortness of breath cough.  Patient took previous 
prescribed tramadol and Tylenol with no relief of symptoms.  
(Aamir Cowart)
 
Past History
 
Travel History
Traveled to [place] past 21 day No
 
Medical History
Any Pertinent Medical History? see below for history
Neurological: peripheral neuropathy
EENT: glaucoma, ABN AUDITORY PERCEPTION
Cardiovascular: AFIB, CHF, hypertension, hyperlipidemia
Respiratory: bronchitis, COPD
Gastrointestinal: GERD, irritable bowel syndrome, GI BLEED
Hepatic: NONE
Renal: NONE
Musculoskeletal: chronic back pain, SHOULDER JOINT PAIN BILATERAL KNEE PAIN
Psychiatric: NONE
Endocrine: diabetes
Blood Disorders: NONE
Cancer(s): NONE
GYN/Reproductive: NONE
History of MRSA: Yes
History of VRE: No
History of CDIFF: No
Tetanus Vaccine: 13
 
Surgical History
Surgical History: non-contributory
 
Psychosocial History
Who do you live with Patient/Self
Services at Home None
What is your primary language English
Tobacco Use: Refused to answer
 
Family History
Family History, If Any:
FATHER (tb, CHF).
MOTHER (pancreatic ca).
 
Hx Contributory? No
(Aamir Cowart)
 
Review of Systems
 
Review of Systems
Constitutional:
Reports: no symptoms. 
EENTM:
Reports: no symptoms. 
Respiratory:
Reports: no symptoms. 
Cardiovascular:
Reports: no symptoms. 
GI:
Reports: no symptoms. 
Genitourinary:
Reports: no symptoms. 
Musculoskeletal:
Reports: see HPI, muscle pain, muscle stiffness, neck pain. 
Skin:
Reports: no symptoms. 
Neurological/Psychological:
Reports: no symptoms. 
Hematologic/Endocrine:
Reports: no symptoms. 
Immunologic/Allergic:
Reports: no symptoms. 
All Other Systems: Reviewed and Negative
(Aamir Cowart)
 
Physical Exam
 
Physical Exam
General Appearance: no apparent distress, obese
Head: atraumatic, TENDERNESS TO PALPATION OF RIGHT TEMPORAL REGION  NO PALPABLE 
CORD. 
Eyes:
Bilateral: normal appearance. 
Ears, Nose, Throat: normal pharynx, normal ENT inspection
Neck: limited range of motion, tender lateral
Respiratory: normal breath sounds, chest non-tender, no respiratory distress
Cardiovascular: irregularly irregular
Gastrointestinal: normal bowel sounds, soft, non-tender
Extremities: normal capillary refill
Neurologic/Psych: no motor/sensory deficits, awake, alert, CNs II-XII nml as 
tested
 
Core Measures
ACS in differential dx? No
CVA/TIA Diagnosis No
Sepsis Present: No
Sepsis Focused Exam Completed? No
(Aamir Cowart)
 
Progress
Differential Diagnosis: AMI, aortic dissection, atrial fibrillation, CHF/pulm 
edema, costochondritis, hyperkalemia, hypovolemia, hyperthyroid, 
hyperventilation, intracranial hemorrhage, musculoskeletal pain, myocarditis, 
pancreatitis, pericarditis, pneumonia, pneumothorax, PSVT, pulmonary embolism, 
PUD/GERD, PVCs/PACs, respiratory failure, sepsis, unstable angina
Plan of Care:
 Orders
 
 
Procedure Date/time Status
 
[facility] SED RATE  1428 Complete
 
TROPONIN LEVEL  1427 Complete
 
COMPREHENSIVE METABOLIC PANEL  1427 Complete
 
CBC WITHOUT DIFFERENTIAL  1427 Complete
 
B-TYPE NATRIURETIC PEP (BNP)  142 Complete
 
EKG  1337 Active
 
 
 Laboratory Tests
 
 
 
18 1437:
ESR Westergren 77  H
 
18 1437:
Anion Gap 15, Estimated GFR > 60, BUN/Creatinine Ratio 18.8, Glucose 128  H, 
Calcium 9.2, Total Bilirubin 1.0, AST 20, ALT 22, Alkaline Phosphatase 89, 
Troponin I < 0.01, Pro-B-Natriuretic Pept 1980  H, Total Protein 6.4, Albumin 
3.6, Globulin 2.8, Albumin/Globulin Ratio 1.3, CBC w Diff NO MAN DIFF REQ, RBC 
3.34  L, .8  H, MCH 34.9  H, MCHC 33.9, RDW 15.8  H, MPV 8.2, Gran % 76.4
 H, Lymphocytes % 13.9  L, Monocytes % 8.4, Eosinophils % 0.4, Basophils % 0.9, 
Absolute Granulocytes 6.2, Absolute Lymphocytes 1.1  L, Absolute Monocytes 0.7  
H, Absolute Eosinophils 0, Absolute Basophils 0.1
Patient currently on initial examination for some complete bedside no apparent 
distress patient has reproducible pain to bilateral paralumbar muscular neck 
region and stiff neck no signs of infection
No concerns of meningitis
Patient was given morphine with relief of symptoms
I reviewed all x-ray findings showing degeneration with patient
 
 
Patient does have mild point tenderness to all regions of the neck and temporal 
region
My suspicion of temporal arteritis is low however due to elevated sedimentation 
rate and patient's symptoms with no ocular or visionary defects that she will be
treated with prednisone and was strongly advised to follow-up with Dr. Humphries.
 
Upon discharge patient looks well no apparent distress, discussed plan with Dr. Alvraez who agrees
 
 
 
Patient had normal steady gait upon discharge
 
 
Diagnostic Imaging:
Viewed by Me: Radiology Read. 
Radiology Impression: no acute abnormality
Initial ED EKG: AFIB (82 BPM)
Comments:
PATIENT: FRANCI CHRISTIANSON  MEDICAL RECORD NO: [id number]
PRESENT AGE: 67  PATIENT ACCOUNT NO: [id number]
: 54  LOCATION: [facility]
ORDERING PHYSICIAN: Aamir FRIAS  
 
  SERVICE DATE: 
EXAM TYPE: RAD - XRY-CERVICAL SPINE TRAUMA
 
EXAMINATION:
XR CERVICAL SPINE
 
CLINICAL INFORMATION:
Neck pain.
 
COMPARISON:
Cervical spine 2017.
 
TECHNIQUE:
Lateral. AP. Odontoid. Swimmer's.
 
FINDINGS:
Vertebrae have normal height, there is no fracture. There is reversal the
normal lordotic curve of the cervical spine. There is degenerative
spondylosis. Disc height narrowing with endplate spurring is pronounced at
C5-C6 and C6-C7. There is multilevel facet joint arthrosis bilaterally, most
severe at the upper cervical spine.
Compared to the prior study of 2017 there is been no substantial change.
 
IMPRESSION:
There is advanced multilevel spondylosis of the cervical spine . No change
since exam of 2017. No acute abnormality.
 
DICTATED BY: Abdelrahman Silva MD 
DATE/TIME DICTATED:18
:ROHITH 
DATE/TIME TRANSCRIBED:06/06/18 / 15
(Aamir Cowart)
 
Departure
 
Departure
Disposition: HOME OR SELF CARE
Condition: Stable
Clinical Impression
Primary Impression: Neck pain
Secondary Impressions: Temporal arteritis
Referrals:
Corey CAMARILLO,Bryce GIL (PCP/Family)
 
Yandel CAMARILLO,Zak LEDEZMA
 
Additional Instructions:
As discussed continue home medications as directed begin the prescription of 
prednisone as directed and begin the prescription of Valium for breakthrough 
pain and muscle relaxation relief.  Prescription is waiting at [place] pharmacy.
 Tomorrow please follow up with rheumatologist Dr. Humphries for further evaluation 
treatment.
Departure Forms:
Customer Survey
General Discharge Information
Prescriptions:
Current Visit Scripts
Prednisone 1 TAB PO DAILY  
     #21 TAB 
 
Diazepam (Valium) 1 TAB PO BID PRN MUSCLE RELAXOR 
     #6 TAB 
 
 
(Aamir Cowart)
 
PA/NP Co-Sign Statement
Statement:
ED Attending supervision documentation-
 
[] I saw and evaluated the patient. I have also reviewed all the pertinent lab 
results and diagnostic results. I agree with the findings and the plan of care 
as documented in the PA's/NP's documentation. 
 
[x] I have reviewed the ED Record and agree with the PA's/NP's documentation.
 
[] Additions or exceptions (if any) to the PAs/NP's note and plan are 
summarized below:
[]
 
(David Alvarez DO)
 
Critical Care Note
 
Critical Care Note
Critical Care Time: 30-74 min
(Aamir Cowart)